# Patient Record
Sex: MALE | Race: WHITE | Employment: OTHER | URBAN - METROPOLITAN AREA
[De-identification: names, ages, dates, MRNs, and addresses within clinical notes are randomized per-mention and may not be internally consistent; named-entity substitution may affect disease eponyms.]

---

## 2017-01-04 PROBLEM — G21.11 NEUROLEPTIC-INDUCED PARKINSONISM (HCC): Status: ACTIVE | Noted: 2017-01-04

## 2017-01-04 PROBLEM — T43.505A NEUROLEPTIC-INDUCED PARKINSONISM (HCC): Status: ACTIVE | Noted: 2017-01-04

## 2017-04-24 PROBLEM — E78.00 HYPERCHOLESTEROLEMIA: Status: ACTIVE | Noted: 2017-04-24

## 2017-04-24 PROBLEM — I70.8 AORTO-ILIAC ATHEROSCLEROSIS (HCC): Status: ACTIVE | Noted: 2017-04-24

## 2017-04-24 PROBLEM — I70.0 AORTO-ILIAC ATHEROSCLEROSIS (HCC): Status: ACTIVE | Noted: 2017-04-24

## 2018-06-11 PROBLEM — J47.9 BRONCHIECTASIS WITHOUT COMPLICATION (HCC): Status: ACTIVE | Noted: 2018-06-11

## 2018-09-25 PROCEDURE — 86803 HEPATITIS C AB TEST: CPT | Performed by: FAMILY MEDICINE

## 2018-09-25 PROCEDURE — 36415 COLL VENOUS BLD VENIPUNCTURE: CPT | Performed by: FAMILY MEDICINE

## 2019-03-11 PROBLEM — G47.33 OSA (OBSTRUCTIVE SLEEP APNEA): Status: ACTIVE | Noted: 2019-03-11

## 2019-03-19 PROBLEM — N18.30 CKD (CHRONIC KIDNEY DISEASE) STAGE 3, GFR 30-59 ML/MIN (HCC): Status: ACTIVE | Noted: 2019-03-19

## 2019-04-14 PROBLEM — G47.33 OSA (OBSTRUCTIVE SLEEP APNEA): Status: RESOLVED | Noted: 2019-03-11 | Resolved: 2019-04-14

## 2020-02-10 ENCOUNTER — APPOINTMENT (OUTPATIENT)
Dept: CT IMAGING | Facility: HOSPITAL | Age: 75
DRG: 388 | End: 2020-02-10
Attending: EMERGENCY MEDICINE
Payer: MEDICARE

## 2020-02-10 ENCOUNTER — HOSPITAL ENCOUNTER (INPATIENT)
Facility: HOSPITAL | Age: 75
LOS: 8 days | Discharge: HOME | DRG: 388 | End: 2020-02-18
Attending: EMERGENCY MEDICINE | Admitting: INTERNAL MEDICINE
Payer: MEDICARE

## 2020-02-10 ENCOUNTER — APPOINTMENT (OUTPATIENT)
Dept: GENERAL RADIOLOGY | Facility: HOSPITAL | Age: 75
DRG: 388 | End: 2020-02-10
Attending: EMERGENCY MEDICINE
Payer: MEDICARE

## 2020-02-10 DIAGNOSIS — E87.1 HYPONATREMIA: ICD-10-CM

## 2020-02-10 DIAGNOSIS — K56.600 PARTIAL INTESTINAL OBSTRUCTION, UNSPECIFIED CAUSE (HCC): Primary | ICD-10-CM

## 2020-02-10 PROBLEM — S37.001S: Status: ACTIVE | Noted: 2020-02-10

## 2020-02-10 LAB
ALBUMIN SERPL-MCNC: 2.5 G/DL (ref 3.4–5)
ALP LIVER SERPL-CCNC: 99 U/L (ref 45–117)
ALT SERPL-CCNC: 32 U/L (ref 16–61)
ANION GAP SERPL CALC-SCNC: 6 MMOL/L (ref 0–18)
AST SERPL-CCNC: 32 U/L (ref 15–37)
BASOPHILS # BLD AUTO: 0.03 X10(3) UL (ref 0–0.2)
BASOPHILS NFR BLD AUTO: 0.2 %
BILIRUB DIRECT SERPL-MCNC: 0.3 MG/DL (ref 0–0.2)
BILIRUB SERPL-MCNC: 0.7 MG/DL (ref 0.1–2)
BILIRUB UR QL: NEGATIVE
BUN BLD-MCNC: 16 MG/DL (ref 7–18)
BUN/CREAT SERPL: 15.1 (ref 10–20)
CALCIUM BLD-MCNC: 8.4 MG/DL (ref 8.5–10.1)
CHLORIDE SERPL-SCNC: 93 MMOL/L (ref 98–112)
CLARITY UR: CLEAR
CO2 SERPL-SCNC: 28 MMOL/L (ref 21–32)
COLOR UR: YELLOW
CREAT BLD-MCNC: 1.06 MG/DL (ref 0.7–1.3)
DEPRECATED RDW RBC AUTO: 43.7 FL (ref 35.1–46.3)
EOSINOPHIL # BLD AUTO: 0 X10(3) UL (ref 0–0.7)
EOSINOPHIL NFR BLD AUTO: 0 %
ERYTHROCYTE [DISTWIDTH] IN BLOOD BY AUTOMATED COUNT: 13.3 % (ref 11–15)
FLUAV + FLUBV RNA SPEC NAA+PROBE: NEGATIVE
FLUAV + FLUBV RNA SPEC NAA+PROBE: NEGATIVE
FLUAV + FLUBV RNA SPEC NAA+PROBE: POSITIVE
GLUCOSE BLD-MCNC: 107 MG/DL (ref 70–99)
GLUCOSE UR-MCNC: NEGATIVE MG/DL
HCT VFR BLD AUTO: 31.2 % (ref 39–53)
HGB BLD-MCNC: 11.1 G/DL (ref 13–17.5)
HGB UR QL STRIP.AUTO: NEGATIVE
IMM GRANULOCYTES # BLD AUTO: 0.08 X10(3) UL (ref 0–1)
IMM GRANULOCYTES NFR BLD: 0.6 %
KETONES UR-MCNC: 20 MG/DL
LACTATE SERPL-SCNC: 1.2 MMOL/L (ref 0.4–2)
LEUKOCYTE ESTERASE UR QL STRIP.AUTO: NEGATIVE
LIPASE SERPL-CCNC: 69 U/L (ref 73–393)
LYMPHOCYTES # BLD AUTO: 0.66 X10(3) UL (ref 1–4)
LYMPHOCYTES NFR BLD AUTO: 4.8 %
M PROTEIN MFR SERPL ELPH: 6.7 G/DL (ref 6.4–8.2)
MCH RBC QN AUTO: 31.8 PG (ref 26–34)
MCHC RBC AUTO-ENTMCNC: 35.6 G/DL (ref 31–37)
MCV RBC AUTO: 89.4 FL (ref 80–100)
MONOCYTES # BLD AUTO: 1.09 X10(3) UL (ref 0.1–1)
MONOCYTES NFR BLD AUTO: 7.9 %
NEUTROPHILS # BLD AUTO: 11.86 X10 (3) UL (ref 1.5–7.7)
NEUTROPHILS # BLD AUTO: 11.86 X10(3) UL (ref 1.5–7.7)
NEUTROPHILS NFR BLD AUTO: 86.5 %
NITRITE UR QL STRIP.AUTO: NEGATIVE
OSMOLALITY SERPL CALC.SUM OF ELEC: 266 MOSM/KG (ref 275–295)
PH UR: 6 [PH] (ref 5–8)
PLATELET # BLD AUTO: 291 10(3)UL (ref 150–450)
POTASSIUM SERPL-SCNC: 4.5 MMOL/L (ref 3.5–5.1)
PROT UR-MCNC: 100 MG/DL
RBC # BLD AUTO: 3.49 X10(6)UL (ref 3.8–5.8)
RBC #/AREA URNS AUTO: 18 /HPF
SODIUM SERPL-SCNC: 127 MMOL/L (ref 136–145)
SODIUM SERPL-SCNC: 16 MMOL/L
UROBILINOGEN UR STRIP-ACNC: <2
WBC # BLD AUTO: 13.7 X10(3) UL (ref 4–11)
WBC #/AREA URNS AUTO: 2 /HPF

## 2020-02-10 PROCEDURE — 83690 ASSAY OF LIPASE: CPT | Performed by: EMERGENCY MEDICINE

## 2020-02-10 PROCEDURE — 74177 CT ABD & PELVIS W/CONTRAST: CPT | Performed by: EMERGENCY MEDICINE

## 2020-02-10 PROCEDURE — 83605 ASSAY OF LACTIC ACID: CPT | Performed by: EMERGENCY MEDICINE

## 2020-02-10 PROCEDURE — 96361 HYDRATE IV INFUSION ADD-ON: CPT

## 2020-02-10 PROCEDURE — 99285 EMERGENCY DEPT VISIT HI MDM: CPT

## 2020-02-10 PROCEDURE — 96374 THER/PROPH/DIAG INJ IV PUSH: CPT

## 2020-02-10 PROCEDURE — 71045 X-RAY EXAM CHEST 1 VIEW: CPT | Performed by: EMERGENCY MEDICINE

## 2020-02-10 PROCEDURE — 87040 BLOOD CULTURE FOR BACTERIA: CPT | Performed by: EMERGENCY MEDICINE

## 2020-02-10 PROCEDURE — 83935 ASSAY OF URINE OSMOLALITY: CPT | Performed by: INTERNAL MEDICINE

## 2020-02-10 PROCEDURE — 84300 ASSAY OF URINE SODIUM: CPT | Performed by: INTERNAL MEDICINE

## 2020-02-10 PROCEDURE — 87631 RESP VIRUS 3-5 TARGETS: CPT | Performed by: EMERGENCY MEDICINE

## 2020-02-10 PROCEDURE — 80076 HEPATIC FUNCTION PANEL: CPT | Performed by: EMERGENCY MEDICINE

## 2020-02-10 PROCEDURE — 36415 COLL VENOUS BLD VENIPUNCTURE: CPT

## 2020-02-10 PROCEDURE — 80048 BASIC METABOLIC PNL TOTAL CA: CPT | Performed by: EMERGENCY MEDICINE

## 2020-02-10 PROCEDURE — 85025 COMPLETE CBC W/AUTO DIFF WBC: CPT | Performed by: EMERGENCY MEDICINE

## 2020-02-10 PROCEDURE — 81001 URINALYSIS AUTO W/SCOPE: CPT | Performed by: EMERGENCY MEDICINE

## 2020-02-10 RX ORDER — KETOROLAC TROMETHAMINE 30 MG/ML
30 INJECTION, SOLUTION INTRAMUSCULAR; INTRAVENOUS ONCE
Status: COMPLETED | OUTPATIENT
Start: 2020-02-10 | End: 2020-02-10

## 2020-02-10 NOTE — ED PROVIDER NOTES
Patient Seen in: [facility] Emergency Department      History   Patient presents with:  Abnormal Result    Stated Complaint:     HPI    81-year-old male presents the ER with complaints of abdominal pain. Patient is febrile in the ER as well.   Jazz Castaneda are as noted in HPI. Constitutional and vital signs reviewed. All other systems reviewed and negative except as noted above.     Physical Exam     ED Triage Vitals   BP 02/10/20 1559 (!) 157/115   Pulse 02/10/20 1601 108   Resp 02/10/20 1601 20   Temp Chloride 93 (*)     Calcium, Total 8.4 (*)     Calculated Osmolality 266 (*)     All other components within normal limits   LIPASE - Abnormal; Notable for the following components:    Lipase 69 (*)     All other components within normal limits   HEPATIC F obstruction  Admission disposition: 2/10/2020  8:13 PM                   Disposition and Plan     Clinical Impression:  Partial intestinal obstruction, unspecified cause ([facility] [address]  (primary encounter diagnosis)  Hyponatremia    Disposition:  Admit  2/10/2020

## 2020-02-10 NOTE — ED INITIAL ASSESSMENT (HPI)
Pt has a small bowel obs, and obs in the kidney. Pt is hyponatremic at 126 as well.  Pt needs to be admitted per md

## 2020-02-11 ENCOUNTER — APPOINTMENT (OUTPATIENT)
Dept: GENERAL RADIOLOGY | Facility: HOSPITAL | Age: 75
DRG: 388 | End: 2020-02-11
Attending: SURGERY
Payer: MEDICARE

## 2020-02-11 LAB
ANION GAP SERPL CALC-SCNC: 8 MMOL/L (ref 0–18)
BASOPHILS # BLD AUTO: 0.03 X10(3) UL (ref 0–0.2)
BASOPHILS NFR BLD AUTO: 0.2 %
BUN BLD-MCNC: 20 MG/DL (ref 7–18)
BUN/CREAT SERPL: 20.8 (ref 10–20)
CALCIUM BLD-MCNC: 7.5 MG/DL (ref 8.5–10.1)
CHLORIDE SERPL-SCNC: 99 MMOL/L (ref 98–112)
CO2 SERPL-SCNC: 24 MMOL/L (ref 21–32)
CREAT BLD-MCNC: 0.96 MG/DL (ref 0.7–1.3)
DEPRECATED RDW RBC AUTO: 44.1 FL (ref 35.1–46.3)
EOSINOPHIL # BLD AUTO: 0.01 X10(3) UL (ref 0–0.7)
EOSINOPHIL NFR BLD AUTO: 0.1 %
ERYTHROCYTE [DISTWIDTH] IN BLOOD BY AUTOMATED COUNT: 13.5 % (ref 11–15)
GLUCOSE BLD-MCNC: 90 MG/DL (ref 70–99)
HAV IGM SER QL: 2.3 MG/DL (ref 1.6–2.6)
HCT VFR BLD AUTO: 28 % (ref 39–53)
HGB BLD-MCNC: 9.6 G/DL (ref 13–17.5)
IMM GRANULOCYTES # BLD AUTO: 0.09 X10(3) UL (ref 0–1)
IMM GRANULOCYTES NFR BLD: 0.7 %
LYMPHOCYTES # BLD AUTO: 0.76 X10(3) UL (ref 1–4)
LYMPHOCYTES NFR BLD AUTO: 5.9 %
MCH RBC QN AUTO: 30.5 PG (ref 26–34)
MCHC RBC AUTO-ENTMCNC: 34.3 G/DL (ref 31–37)
MCV RBC AUTO: 88.9 FL (ref 80–100)
MONOCYTES # BLD AUTO: 1.6 X10(3) UL (ref 0.1–1)
MONOCYTES NFR BLD AUTO: 12.4 %
NEUTROPHILS # BLD AUTO: 10.42 X10 (3) UL (ref 1.5–7.7)
NEUTROPHILS # BLD AUTO: 10.42 X10(3) UL (ref 1.5–7.7)
NEUTROPHILS NFR BLD AUTO: 80.7 %
OSMOLALITY SERPL CALC.SUM OF ELEC: 274 MOSM/KG (ref 275–295)
OSMOLALITY UR: 645 MOSM/KG (ref 300–1100)
PLATELET # BLD AUTO: 274 10(3)UL (ref 150–450)
POTASSIUM SERPL-SCNC: 4.2 MMOL/L (ref 3.5–5.1)
RBC # BLD AUTO: 3.15 X10(6)UL (ref 3.8–5.8)
SODIUM SERPL-SCNC: 131 MMOL/L (ref 136–145)
WBC # BLD AUTO: 12.9 X10(3) UL (ref 4–11)

## 2020-02-11 PROCEDURE — 85025 COMPLETE CBC W/AUTO DIFF WBC: CPT | Performed by: INTERNAL MEDICINE

## 2020-02-11 PROCEDURE — 74270 X-RAY XM COLON 1CNTRST STD: CPT | Performed by: SURGERY

## 2020-02-11 PROCEDURE — 80048 BASIC METABOLIC PNL TOTAL CA: CPT | Performed by: INTERNAL MEDICINE

## 2020-02-11 PROCEDURE — 83735 ASSAY OF MAGNESIUM: CPT | Performed by: INTERNAL MEDICINE

## 2020-02-11 RX ORDER — LEVOTHYROXINE SODIUM 0.12 MG/1
125 TABLET ORAL
Status: DISCONTINUED | OUTPATIENT
Start: 2020-02-11 | End: 2020-02-18

## 2020-02-11 RX ORDER — FINASTERIDE 5 MG/1
5 TABLET, FILM COATED ORAL DAILY
Status: DISCONTINUED | OUTPATIENT
Start: 2020-02-11 | End: 2020-02-18

## 2020-02-11 RX ORDER — DOCUSATE SODIUM 100 MG/1
100 CAPSULE, LIQUID FILLED ORAL 2 TIMES DAILY
Status: DISCONTINUED | OUTPATIENT
Start: 2020-02-11 | End: 2020-02-12

## 2020-02-11 RX ORDER — METOPROLOL SUCCINATE 25 MG/1
25 TABLET, EXTENDED RELEASE ORAL
Status: DISCONTINUED | OUTPATIENT
Start: 2020-02-11 | End: 2020-02-18

## 2020-02-11 RX ORDER — ARIPIPRAZOLE 2 MG/1
2.5 TABLET ORAL DAILY
Status: DISCONTINUED | OUTPATIENT
Start: 2020-02-11 | End: 2020-02-18

## 2020-02-11 RX ORDER — TAMSULOSIN HYDROCHLORIDE 0.4 MG/1
0.4 CAPSULE ORAL DAILY
Status: DISCONTINUED | OUTPATIENT
Start: 2020-02-11 | End: 2020-02-18

## 2020-02-11 RX ORDER — ALBUTEROL SULFATE 90 UG/1
2 AEROSOL, METERED RESPIRATORY (INHALATION) EVERY 6 HOURS PRN
Status: DISCONTINUED | OUTPATIENT
Start: 2020-02-11 | End: 2020-02-18

## 2020-02-11 RX ORDER — ROSUVASTATIN CALCIUM 5 MG/1
5 TABLET, COATED ORAL NIGHTLY
Status: DISCONTINUED | OUTPATIENT
Start: 2020-02-11 | End: 2020-02-18

## 2020-02-11 RX ORDER — ACETAMINOPHEN 325 MG/1
650 TABLET ORAL EVERY 6 HOURS PRN
Status: DISCONTINUED | OUTPATIENT
Start: 2020-02-11 | End: 2020-02-18

## 2020-02-11 RX ORDER — ONDANSETRON 4 MG/1
4 TABLET, FILM COATED ORAL EVERY 6 HOURS PRN
Status: DISCONTINUED | OUTPATIENT
Start: 2020-02-11 | End: 2020-02-18

## 2020-02-11 RX ORDER — SODIUM CHLORIDE 9 MG/ML
INJECTION, SOLUTION INTRAVENOUS CONTINUOUS
Status: DISCONTINUED | OUTPATIENT
Start: 2020-02-11 | End: 2020-02-13

## 2020-02-11 RX ORDER — DULOXETIN HYDROCHLORIDE 30 MG/1
60 CAPSULE, DELAYED RELEASE ORAL DAILY
Status: DISCONTINUED | OUTPATIENT
Start: 2020-02-11 | End: 2020-02-18

## 2020-02-11 NOTE — ED NOTES
Pt arrived to ER with an PIV from [facility], Removed and different IV placed.

## 2020-02-11 NOTE — H&P
[facility] HOSPITALIST HISTORY AND PHYSICAL     CC: Patient presents with:  Abnormal Result       PCP: Kyrie German MD    History of Present Illness:   Patient is a 69year old male with PMH sig for HTN, HL, EMELY, CKD3, hypothyroidism here with abdominal pain/ s L2-L3 for spinal stenosis - Dr. Heather Hodges   • TONSILLECTOMY          ALL:    Dander                  OTHER (SEE COMMENTS)    Comment:Cats-congestion in chest when around cats          Social History    Tobacco Use      Smoking status: Never Smoker      Sm FINDINGS: RIGHT TESTICLE:  The right testis measures 4.3 x 2.0 x 1.8 cm. ECHOTEXTURE:  Mild heterogeneous echotexture      MASS: None. VASCULAR FLOW: Mild hypervascularity      EPIDIDYMIS:  4 mm epididymal cyst. Additional 4 mm scrotal melissa.  LEF mass or cyst is identified. No peripancreatic inflammatory changes. No pancreatic ductal dilatation. SPLEEN: No splenic mass. Spleen is within normal limits in size. STOMACH: There is a small hiatal hernia. There is no abnormal gastric wall thickening.  ADR CARDIAC/MEDIAST: The cardiomediastinal silhouette is not enlarged. There is stable calcification of the aortic knob. VASC/JON:    The hilar contours are normal. LUNGS/PLEURA:  There is stable mild atelectasis at both lung bases.   There is no pleural effu ROUTINE, 11/10/2015, 10:31. INDICATIONS: Abdominal pain, hyponatremic today. TECHNIQUE:   Single view. FINDINGS:  CARDIAC/VASC: No cardiac silhouette abnormality or cardiomegaly. Unremarkable pulmonary vasculature.   MEDIAST/JON:   Atherosclerotic aor are few hypodensities too small to characterize. SPLEEN: No enlargement or focal lesion. GALLBLADDER: No cholelithiasis. PANCREAS: No lesion, fluid collection, ductal dilatation, or atrophy. ADRENALS: No defined mass or abnormal enlargement.   KIDNEYS: Hyacinth Grace although a discrete transition point is not clearly defined. Small bilateral pleural effusions. Enlarged prostate. Lesser incidental findings as above.      Dictated by (CST): Courtney Madrid MD on 2/10/2020 at 18:49     Approved by (CST): Rafa Martin above.    [facility] hospitalist to continue to follow patient while in house        Ambrosio Nur,  [facility] Hospitalist  Pager [phone]    2/11/2020  10:59 AM

## 2020-02-11 NOTE — CONSULTS
[facility]OSP - [facility]    Report of Consultation    Florencio Hendrix Patient Status:  Inpatient    1945 MRN [id number]   Location [facility] 3W/SW Attending Ruben Bustamante MD   Hosp Day # 1 PCP Hever Isabel MD     Date of Admission: • Solitary left kidney     (atrophied right kidney)   • Tremor     essential   • Umbilical hernia 1553     Past Surgical History:   Procedure Laterality Date   • OTHER SURGICAL HISTORY  1986    thyroidectomy for thyroid cancer   • SPINAL FUSION  2012 lower extremities  NEURO: no sensory or motor complaint  PSYCHE: no symptoms of depression or anxiety  HEMATOLOGY: no bruising or excessive bleeding;  ENDOCRINE: denies excessive thirst or urination; denies unexpected wt gain or wt loss      Physical Exam: MD CHAVA on 2/11/2020 at 12:53     Approved by (CST): Shavonne Malave MD on 2/11/2020 at 12:56          Us Testicles (evw=[id number])    Result Date: 2/10/2020  IMPRESSION: 1.  Heterogeneous, hypervascular right testis sonographically suggestive of orchitis likely representing atelectasis or scar. 2.  Prominent bowel distention in the visualized right upper quadrant the abdomen. This could represent changes of ileus or bowel obstruction.   Chronic bowel dilatation such as [place] syndrome could potentially gi Hyponatremia      Impression  No evidence of bowel compromise. I saw pt in AM, BE done later shows no obstruction.      I have asked NG to be removed, draining small amts and no evidence of SBO  Pt does have right hydro,  on case  Will start diet and la

## 2020-02-11 NOTE — CONSULTS
[facility]    Report of Consultation    Shahzad Wells Patient Status:  Inpatient    1945 MRN [id number]   Location [facility] 3W/SW Attending Milton Sosa MD   Hosp Day # 1 PCP Mónica Molina MD     Reason for Consultat essential   • Umbilical hernia 8106     Past Surgical History:   Procedure Laterality Date   • OTHER SURGICAL HISTORY  1986    thyroidectomy for thyroid cancer   • SPINAL FUSION  2012    L3-L5 for spinal stenosis - Dr. Annika Wynn   • Jah Solano  [facility] erythema/crepitus/induration/necrosis  Extremities: moves all extremities equally, no deformities  Neurologic: CNs II-XII grossly intact   Psych: normal mood and affect    Laboratory Data:  Lab Results   Component Value Date    WBC 12.9 02/11/2020    HGB 9 obtained after the intravenous administration nonionic contrast.  Multiplanar reformations obtained. Dose reduction techniques utilized.   Dose information is transmitted to the [facility] ([facility]   of Radiology) Nae Holder 35 ([address]) associated renal parenchymal thickening likely chronic hydronephrosis. Decreased enhancement right kidney as compared to the left also likely relating to chronic obstruction. Findings appear to relate to   chronic UPJ obstruction.   Asymmetric right perin Spleen is within normal limits in size. STOMACH: There is a small hiatal hernia. There is no abnormal gastric wall thickening. ADRENAL GLANDS: No adrenal mass or nodule.   KIDNEYS: The left kidney is normal. There is severe right hydronephrosis with obstr inconsistent with ultrasound findings   His urinalysis is not suggestive of infection  Consider treating with abx though exam inconsistent with ultrasound findings - will discuss with IM    RIGHT HYDROCELE  Not bothersome  Conservative management    [facility]

## 2020-02-11 NOTE — CONSULTS
[facility]    Report of Consultation    Jefe Risk Patient Status:  Inpatient    1945 MRN [id number]   Location [facility] 3W/SW Attending Gabe De La Torre MD   Hosp Day # 1 PCP Sal Sesay MD     Date of Admission: personality disorder ([facility] [address]    • OCD (obsessive compulsive disorder)     Dr. Wilfrid Sher (psychiatry) @ [facility]   • EMELY (obstructive sleep apnea) 3/24/18-PSG    AHI 7 RDI 9 REM AHI 5 Supine AHI 9 non-supine AHI 3.8 Sao2 Gideon 82%    • EMELY (obstructive 100 mg, Oral, BID      Rosuvastatin Calcium 5 MG Oral Tab, Take one tablet by mouth every day for cholesterol  Levothyroxine Sodium 112 MCG Oral Tab, TAKE ONE TABLET BY MOUTH  EVERY DAY FOR THYROID  Metoprolol Succinate ER 50 MG Oral Tablet 24 Hr, TAKE 1 T ALB 2.5 (L) 02/10/2020    ALKPHO 99 02/10/2020    TP 6.7 02/10/2020    AST 32 02/10/2020    ALT 32 02/10/2020    INR 1.0 10/19/2015    T4F 1.35 06/01/2017    TSH 4.841 03/18/2019    LIP 69 (L) 02/10/2020    PSA 6.79 (H) 02/05/2018    DDIMER 0.50 05/29/2 feeding tube placement. The tip of the tube is in the distal esophagus. The distal 5 cm of the tube is folded upon itself at the level of the gastroesophageal junction. Recommend adjustment in position.    Dictated by (CST): Mae Fragoso MD on 2/10/2020 Rene Dunlap MD  [address]  Nephrology

## 2020-02-11 NOTE — PLAN OF CARE
Problem: Patient Centered Care  Goal: Patient preferences are identified and integrated in the patient's plan of care  Description  Interventions:  - What would you like us to know as we care for you? Keep me updated on my care.   - Provide timely, comple toileting schedule  Outcome: Progressing     Problem: DISCHARGE PLANNING  Goal: Discharge to home or other facility with appropriate resources  Description  INTERVENTIONS:  - Identify barriers to discharge w/pt and caregiver  - Include patient/family/disch

## 2020-02-11 NOTE — PAYOR COMM NOTE
--------------  Appropriate for inpatient status per guidelines for abdominal pain with temp to 103, influenza positive, and hydronephrosis with UPJ obstruction.       ADMISSION REVIEW     Payor: [address] [place] [address] #:  [phone]  [facility] TONSILLECTOMY                        Review of Systems    Positive for stated complaint:   Other systems are as noted in HPI. Constitutional and vital signs reviewed. All other systems reviewed and negative except as noted above.     Physical Exam Abnormal; Notable for the following components:       Result Value    Glucose 107 (*)     Sodium 127 (*)     Chloride 93 (*)     Calcium, Total 8.4 (*)     Calculated Osmolality 266 (*)     All other components within normal limits   LIPASE - Abnormal; Not [facility])  (primary encounter diagnosis)  Hyponatremia    Disposition:  Admit  2/10/2020  8:13 pm                 CT ABD 2/10      CONCLUSION:      Moderate to severe right hydronephrosis with associated renal parenchymal thickening likely chronic hydronephros medically managed and resolved. + remote prior colonscopy. His workup was also notable for a hyponatremia as well as abnormal CT and testicular US showing findings of mod to severe R hydronephrosis and UPJ obstruction as well as possible orchitis.  He denie       MDD  HTN  HL  EMELY   Aortic atherosclerosis  CKD3  Hypothyroidism (hx thyroid ca sp surg 1986) on synthroid  Neuroleptic induced parkinsonism  Obesity BMI 28.42     Abdominal pain, bowel obstruction v ileus  - gen surg consulted  - relief noted s/p with abx though exam inconsistent with ultrasound findings - will discuss with IM     RIGHT HYDROCELE  Not bothersome  Conservative management     MICROHEMATURIA  UA = 18 RBCs  Consider outpatient cystoscopy                     2/11 SURG    Impression  No [id number]   Collected:  2/10/2020 19:05 Status:  Final result   Specimen Information: Nasopharyngeal swab;  Other        Component  Ref Range & Units    Influenza A by PCR  Negative Negative    Influenza B by PCR  Negative Negative    RSV by PCR  Negative P

## 2020-02-12 ENCOUNTER — APPOINTMENT (OUTPATIENT)
Dept: GENERAL RADIOLOGY | Facility: HOSPITAL | Age: 75
DRG: 388 | End: 2020-02-12
Attending: PHYSICIAN ASSISTANT
Payer: MEDICARE

## 2020-02-12 LAB
ALBUMIN SERPL-MCNC: 2.4 G/DL (ref 3.4–5)
ANION GAP SERPL CALC-SCNC: 4 MMOL/L (ref 0–18)
ANION GAP SERPL CALC-SCNC: 4 MMOL/L (ref 0–18)
BASE EXCESS BLD CALC-SCNC: 3.2 MMOL/L (ref ?–2)
BASOPHILS # BLD AUTO: 0.04 X10(3) UL (ref 0–0.2)
BASOPHILS NFR BLD AUTO: 0.2 %
BUN BLD-MCNC: 15 MG/DL (ref 7–18)
BUN BLD-MCNC: 15 MG/DL (ref 7–18)
BUN/CREAT SERPL: 15.3 (ref 10–20)
BUN/CREAT SERPL: 15.3 (ref 10–20)
CALCIUM BLD-MCNC: 8 MG/DL (ref 8.5–10.1)
CALCIUM BLD-MCNC: 8 MG/DL (ref 8.5–10.1)
CHLORIDE SERPL-SCNC: 100 MMOL/L (ref 98–112)
CHLORIDE SERPL-SCNC: 100 MMOL/L (ref 98–112)
CO2 SERPL-SCNC: 28 MMOL/L (ref 21–32)
CO2 SERPL-SCNC: 28 MMOL/L (ref 21–32)
CREAT BLD-MCNC: 0.98 MG/DL (ref 0.7–1.3)
CREAT BLD-MCNC: 0.98 MG/DL (ref 0.7–1.3)
DEPRECATED RDW RBC AUTO: 45 FL (ref 35.1–46.3)
EOSINOPHIL # BLD AUTO: 0.06 X10(3) UL (ref 0–0.7)
EOSINOPHIL NFR BLD AUTO: 0.4 %
ERYTHROCYTE [DISTWIDTH] IN BLOOD BY AUTOMATED COUNT: 13.8 % (ref 11–15)
GLUCOSE BLD-MCNC: 105 MG/DL (ref 70–99)
GLUCOSE BLD-MCNC: 105 MG/DL (ref 70–99)
HAV IGM SER QL: 2.4 MG/DL (ref 1.6–2.6)
HCO3 BLDA-SCNC: 27.4 MEQ/L (ref 21–27)
HCT VFR BLD AUTO: 33.5 % (ref 39–53)
HGB BLD-MCNC: 11.4 G/DL (ref 13–17.5)
IMM GRANULOCYTES # BLD AUTO: 0.18 X10(3) UL (ref 0–1)
IMM GRANULOCYTES NFR BLD: 1.1 %
LYMPHOCYTES # BLD AUTO: 1.23 X10(3) UL (ref 1–4)
LYMPHOCYTES NFR BLD AUTO: 7.6 %
MCH RBC QN AUTO: 30.5 PG (ref 26–34)
MCHC RBC AUTO-ENTMCNC: 34 G/DL (ref 31–37)
MCV RBC AUTO: 89.6 FL (ref 80–100)
MODIFIED ALLEN TEST: POSITIVE
MONOCYTES # BLD AUTO: 1.75 X10(3) UL (ref 0.1–1)
MONOCYTES NFR BLD AUTO: 10.9 %
NEUTROPHILS # BLD AUTO: 12.84 X10 (3) UL (ref 1.5–7.7)
NEUTROPHILS # BLD AUTO: 12.84 X10(3) UL (ref 1.5–7.7)
NEUTROPHILS NFR BLD AUTO: 79.8 %
O2 CT BLD-SCNC: 15.1 VOL% (ref 15–23)
OSMOLALITY SERPL CALC.SUM OF ELEC: 275 MOSM/KG (ref 275–295)
OSMOLALITY SERPL CALC.SUM OF ELEC: 275 MOSM/KG (ref 275–295)
OXYGEN LITERS/MINUTE: 2 L/MIN
PCO2 BLDA: 37 MM HG (ref 35–45)
PH BLDA: 7.47 [PH] (ref 7.35–7.45)
PHOSPHATE SERPL-MCNC: 1.5 MG/DL (ref 2.5–4.9)
PHOSPHATE SERPL-MCNC: 1.5 MG/DL (ref 2.5–4.9)
PLATELET # BLD AUTO: 322 10(3)UL (ref 150–450)
PO2 BLDA: 70 MM HG (ref 80–100)
POTASSIUM SERPL-SCNC: 4.2 MMOL/L (ref 3.5–5.1)
POTASSIUM SERPL-SCNC: 4.2 MMOL/L (ref 3.5–5.1)
PROCALCITONIN SERPL-MCNC: 3.2 NG/ML
PUNCTURE CHARGE: YES
RBC # BLD AUTO: 3.74 X10(6)UL (ref 3.8–5.8)
SAO2 % BLDA: 97.5 % (ref 94–100)
SODIUM SERPL-SCNC: 132 MMOL/L (ref 136–145)
SODIUM SERPL-SCNC: 132 MMOL/L (ref 136–145)
WBC # BLD AUTO: 16.1 X10(3) UL (ref 4–11)

## 2020-02-12 PROCEDURE — 84145 PROCALCITONIN (PCT): CPT | Performed by: INTERNAL MEDICINE

## 2020-02-12 PROCEDURE — 84100 ASSAY OF PHOSPHORUS: CPT | Performed by: PHYSICIAN ASSISTANT

## 2020-02-12 PROCEDURE — 85025 COMPLETE CBC W/AUTO DIFF WBC: CPT | Performed by: PHYSICIAN ASSISTANT

## 2020-02-12 PROCEDURE — 80069 RENAL FUNCTION PANEL: CPT | Performed by: INTERNAL MEDICINE

## 2020-02-12 PROCEDURE — 36600 WITHDRAWAL OF ARTERIAL BLOOD: CPT | Performed by: INTERNAL MEDICINE

## 2020-02-12 PROCEDURE — 82805 BLOOD GASES W/O2 SATURATION: CPT | Performed by: INTERNAL MEDICINE

## 2020-02-12 PROCEDURE — 74019 RADEX ABDOMEN 2 VIEWS: CPT | Performed by: PHYSICIAN ASSISTANT

## 2020-02-12 PROCEDURE — 83735 ASSAY OF MAGNESIUM: CPT | Performed by: PHYSICIAN ASSISTANT

## 2020-02-12 RX ORDER — LEVOFLOXACIN 5 MG/ML
750 INJECTION, SOLUTION INTRAVENOUS EVERY 24 HOURS
Status: DISCONTINUED | OUTPATIENT
Start: 2020-02-12 | End: 2020-02-18

## 2020-02-12 NOTE — PROGRESS NOTES
[facility]    Progress Note    Kizzy Woods Patient Status:  Inpatient    1945 MRN [id number]   Location [facility] 4W/SW/SE Attending Shivani Harrison, [address] [facility] Day # 2 PCP Saloni Donis MD        Subjective:   Patient 02/12/2020    CA 8.0 (L) 02/12/2020    ALB 2.4 (L) 02/12/2020    ALKPHO 99 02/10/2020    BILT 0.7 02/10/2020    TP 6.7 02/10/2020    AST 32 02/10/2020    ALT 32 02/10/2020    INR 1.0 10/19/2015    T4F 1.35 06/01/2017    TSH 4.841 03/18/2019    LIP 69 (L) 0 2/11/2020 at 6:13          Xr Chest Ap Portable  (cpt=71045)    Result Date: 2/10/2020  CONCLUSION: Post feeding tube placement. The tip of the tube is in the distal esophagus.   The distal 5 cm of the tube is folded upon itself at the level of the [employer] MD on 2/10/2020 at 18:49     Approved by (CST): Jarett Mondragon MD on 2/10/2020 at 19:14                     Rd Turner PA-C  2/12/2020

## 2020-02-12 NOTE — CONSULTS
[facility] HOSP - [facility]    Report of Consultation    Navya Colbert Patient Status:  Inpatient    1945 MRN [id number]   Location [facility] 4W/SW/SE Attending Steven Aly, [address] [facility] Day # 2 PCP Lisa Ponce MD     Date of Admissi unspecified hyperlipidemia    • Post-surgical hypothyroidism    • Seborrheic dermatitis    • Solitary left kidney     (atrophied right kidney)   • Tremor     essential   • Umbilical hernia 4608       Past Surgical History  Past Surgical History:   Procedur EVERY DAY FOR THYROID  Metoprolol Succinate ER 50 MG Oral Tablet 24 Hr, TAKE 1 TABLET BY MOUTH  EVERY DAY FOR BLOOD  PRESSURE  amLODIPine Besy-Benazepril HCl 2.5-10 MG Oral Cap, TAKE 1 CAPSULE BY MOUTH  EVERY DAY FOR BLOOD  PRESSURE  hydrochlorothiazide 1 CO2 28.0 02/12/2020     (H) 02/12/2020     (H) 02/12/2020    CA 8.0 (L) 02/12/2020    CA 8.0 (L) 02/12/2020    ALB 2.4 (L) 02/12/2020    ALKPHO 99 02/10/2020    TP 6.7 02/10/2020    AST 32 02/10/2020    ALT 32 02/10/2020    INR 1.0 10/19/2015 abdomen. This could represent changes of ileus or bowel obstruction. Chronic bowel dilatation such as Neema syndrome could potentially give this appearance. 3.  Post multilevel thoracic-lumbar spine fusion.    Dictated by (CST): Tommie Lozada MD on 2/10/ abdominal distention and constipation over the last 7 days. He has had a similar presentation in the past.  He presents with leukocytosis which is likely due to RSV and PNA.    - KUB with moderate diffuse distention of the colon.   Lower GI Series with con

## 2020-02-12 NOTE — RESPIRATORY THERAPY NOTE
EMELY ASSESSMENT:    Pt does not have a previous diagnosis of EMELY. Pt does not routinely use a CPAP device at home.

## 2020-02-12 NOTE — PLAN OF CARE
Problem: Patient Centered Care  Goal: Patient preferences are identified and integrated in the patient's plan of care  Description  Interventions:  - What would you like us to know as we care for you? Keep me updated on my care.   - Provide timely, comple Consider OT/PT consult to assist with strengthening/mobility  - Encourage toileting schedule  Outcome: Progressing  Note:   Fall precautions in place. Pt educated to call staff for assistance ambulating. Frequent visual checks by RN and PCT.       Problem:

## 2020-02-12 NOTE — PROGRESS NOTES
[facility]    Progress Note    Navya [facility] Patient Status:  Inpatient    1945 MRN [id number]   Location [facility] 4W/SW/SE Attending Steven Aly, [address] [facility] Day # 2 PCP Lisa Ponce MD            Subjective:     Lydia Gamez 8. 0*  8.0*   ALB 2.5* 2.5*  --  2.4*   * 127* 131* 132*  132*   K 4.90 4.5 4.2 4.2  4.2   CL 92* 93* 99 100  100   CO2 28.6 28.0 24.0 28.0  28.0   ALKPHO 103 99  --   --    AST 36 32  --   --    ALT 37 32  --   --    BILT 0.73 0.7  --   --    TP 6.5 placement. The tip of the tube is in the distal esophagus. The distal 5 cm of the tube is folded upon itself at the level of the gastroesophageal junction. Recommend adjustment in position.    Dictated by (CST): Bob Osman MD on 2/10/2020 at 21:15 and Plan:       Partial intestinal obstruction, unspecified cause ([facility] [address]   Work up shows no colon obstruction    Awaiting repeat xray abd today    Most likely with cont to increase diet          Lydia Palomo MD [facility]  General Surgery  [facility]  2

## 2020-02-12 NOTE — PROGRESS NOTES
[facility]    Progress Note    Josue Olivier Patient Status:  Inpatient    1945 MRN [id number]   Location [facility] 4W/SW/SE Attending Esvin Mancia, [address] [facility] Day # 2 PCP Natty Rucker MD     Assessment and Plan:   P 02/12/2020    BUN 15 02/12/2020    BUN 15 02/12/2020     (L) 02/12/2020     (L) 02/12/2020    K 4.2 02/12/2020    K 4.2 02/12/2020     02/12/2020     02/12/2020    CO2 28.0 02/12/2020    CO2 28.0 02/12/2020     (H) 02/12/2020 2/10/2020  CONCLUSION:  1. Small right basal pleural effusion. Nonspecific bibasilar pulmonary opacities likely representing atelectasis or scar. 2.  Prominent bowel distention in the visualized right upper quadrant the abdomen.   This could represent amanda

## 2020-02-12 NOTE — PROGRESS NOTES
[facility] Hospitalist Progress Note   CC: follow-up hospital admission    ASSESSMENT/PLAN:  69year old male with PMH sig for HTN, HL, EMELY, CKD3, hypothyroidism here with abdominal pain/ swelling 2/2 bowel obs/ ileus s/p NG placement, R hydronephrosis- chronic, Rosuvastatin Calcium  5 mg Oral Nightly   • Metoprolol Succinate ER  25 mg Oral Daily Beta Blocker   • tamsulosin HCl  0.4 mg Oral Daily     Continuous Infusions:   • sodium chloride 75 mL/hr at 02/12/20 0547     PRN Meds: Ondansetron HCl, acetaminophen, A Value Date    PROTIME 9.9 10/19/2015    INR 1.0 10/19/2015       Maye Gonzales [facility] Hospitalist  Pager: [phone]  Answering Service: [phone]

## 2020-02-13 LAB
ALBUMIN SERPL-MCNC: 2 G/DL (ref 3.4–5)
ANION GAP SERPL CALC-SCNC: 6 MMOL/L (ref 0–18)
ANION GAP SERPL CALC-SCNC: 6 MMOL/L (ref 0–18)
BASOPHILS # BLD AUTO: 0.03 X10(3) UL (ref 0–0.2)
BASOPHILS NFR BLD AUTO: 0.2 %
BUN BLD-MCNC: 10 MG/DL (ref 7–18)
BUN BLD-MCNC: 10 MG/DL (ref 7–18)
BUN/CREAT SERPL: 11.6 (ref 10–20)
BUN/CREAT SERPL: 11.6 (ref 10–20)
CALCIUM BLD-MCNC: 7.5 MG/DL (ref 8.5–10.1)
CALCIUM BLD-MCNC: 7.5 MG/DL (ref 8.5–10.1)
CHLORIDE SERPL-SCNC: 100 MMOL/L (ref 98–112)
CHLORIDE SERPL-SCNC: 100 MMOL/L (ref 98–112)
CO2 SERPL-SCNC: 28 MMOL/L (ref 21–32)
CO2 SERPL-SCNC: 28 MMOL/L (ref 21–32)
CREAT BLD-MCNC: 0.86 MG/DL (ref 0.7–1.3)
CREAT BLD-MCNC: 0.86 MG/DL (ref 0.7–1.3)
DEPRECATED RDW RBC AUTO: 43.9 FL (ref 35.1–46.3)
EOSINOPHIL # BLD AUTO: 0.16 X10(3) UL (ref 0–0.7)
EOSINOPHIL NFR BLD AUTO: 1.3 %
ERYTHROCYTE [DISTWIDTH] IN BLOOD BY AUTOMATED COUNT: 13.7 % (ref 11–15)
GLUCOSE BLD-MCNC: 135 MG/DL (ref 70–99)
GLUCOSE BLD-MCNC: 135 MG/DL (ref 70–99)
HAV IGM SER QL: 1.9 MG/DL (ref 1.6–2.6)
HCT VFR BLD AUTO: 29 % (ref 39–53)
HGB BLD-MCNC: 10.1 G/DL (ref 13–17.5)
IMM GRANULOCYTES # BLD AUTO: 0.14 X10(3) UL (ref 0–1)
IMM GRANULOCYTES NFR BLD: 1.2 %
LYMPHOCYTES # BLD AUTO: 0.91 X10(3) UL (ref 1–4)
LYMPHOCYTES NFR BLD AUTO: 7.6 %
MCH RBC QN AUTO: 30.6 PG (ref 26–34)
MCHC RBC AUTO-ENTMCNC: 34.8 G/DL (ref 31–37)
MCV RBC AUTO: 87.9 FL (ref 80–100)
MONOCYTES # BLD AUTO: 1.18 X10(3) UL (ref 0.1–1)
MONOCYTES NFR BLD AUTO: 9.8 %
NEUTROPHILS # BLD AUTO: 9.63 X10 (3) UL (ref 1.5–7.7)
NEUTROPHILS # BLD AUTO: 9.63 X10(3) UL (ref 1.5–7.7)
NEUTROPHILS NFR BLD AUTO: 79.9 %
OSMOLALITY SERPL CALC.SUM OF ELEC: 279 MOSM/KG (ref 275–295)
OSMOLALITY SERPL CALC.SUM OF ELEC: 279 MOSM/KG (ref 275–295)
PHOSPHATE SERPL-MCNC: 1.6 MG/DL (ref 2.5–4.9)
PHOSPHATE SERPL-MCNC: 1.6 MG/DL (ref 2.5–4.9)
PLATELET # BLD AUTO: 260 10(3)UL (ref 150–450)
POTASSIUM SERPL-SCNC: 4.1 MMOL/L (ref 3.5–5.1)
POTASSIUM SERPL-SCNC: 4.1 MMOL/L (ref 3.5–5.1)
PROCALCITONIN SERPL-MCNC: 1.6 NG/ML
RBC # BLD AUTO: 3.3 X10(6)UL (ref 3.8–5.8)
SODIUM SERPL-SCNC: 134 MMOL/L (ref 136–145)
SODIUM SERPL-SCNC: 134 MMOL/L (ref 136–145)
WBC # BLD AUTO: 12.1 X10(3) UL (ref 4–11)

## 2020-02-13 PROCEDURE — 94640 AIRWAY INHALATION TREATMENT: CPT

## 2020-02-13 PROCEDURE — 80069 RENAL FUNCTION PANEL: CPT | Performed by: PHYSICIAN ASSISTANT

## 2020-02-13 PROCEDURE — 84145 PROCALCITONIN (PCT): CPT | Performed by: INTERNAL MEDICINE

## 2020-02-13 PROCEDURE — 83735 ASSAY OF MAGNESIUM: CPT | Performed by: PHYSICIAN ASSISTANT

## 2020-02-13 PROCEDURE — 85025 COMPLETE CBC W/AUTO DIFF WBC: CPT | Performed by: PHYSICIAN ASSISTANT

## 2020-02-13 PROCEDURE — 80048 BASIC METABOLIC PNL TOTAL CA: CPT | Performed by: PHYSICIAN ASSISTANT

## 2020-02-13 PROCEDURE — 84100 ASSAY OF PHOSPHORUS: CPT | Performed by: PHYSICIAN ASSISTANT

## 2020-02-13 RX ORDER — HEPARIN SODIUM 5000 [USP'U]/ML
5000 INJECTION, SOLUTION INTRAVENOUS; SUBCUTANEOUS EVERY 8 HOURS SCHEDULED
Status: DISCONTINUED | OUTPATIENT
Start: 2020-02-13 | End: 2020-02-18

## 2020-02-13 RX ORDER — IPRATROPIUM BROMIDE AND ALBUTEROL SULFATE 2.5; .5 MG/3ML; MG/3ML
3 SOLUTION RESPIRATORY (INHALATION)
Status: DISCONTINUED | OUTPATIENT
Start: 2020-02-13 | End: 2020-02-18

## 2020-02-13 RX ORDER — CODEINE PHOSPHATE AND GUAIFENESIN 10; 100 MG/5ML; MG/5ML
5 SOLUTION ORAL EVERY 6 HOURS PRN
Status: DISCONTINUED | OUTPATIENT
Start: 2020-02-13 | End: 2020-02-18

## 2020-02-13 RX ORDER — IPRATROPIUM BROMIDE AND ALBUTEROL SULFATE 2.5; .5 MG/3ML; MG/3ML
3 SOLUTION RESPIRATORY (INHALATION) EVERY 4 HOURS PRN
Status: DISCONTINUED | OUTPATIENT
Start: 2020-02-13 | End: 2020-02-18

## 2020-02-13 NOTE — PROGRESS NOTES
Progress Note    Puneet Dutta Patient Status:  Inpatient    1945 MRN [id number]   Location [facility] 4W/SW/SE Attending Wali Yo, [address] [facility] Day # 3 PCP Husam Romano MD     Subjective:  Puneet Dutta is a(n) 69year old ma OBSTRUCTION  Severe right hydronephrosis, likely secondary to UPJ obstruction, no left hydronephrosis  NO flank pain and renal function wnl  Conservative management - no surgical intervention necessary at this time     ABNORMAL SCRUS/POSSIBLE ORCHITIS  SCR

## 2020-02-13 NOTE — PROGRESS NOTES
[facility]    Progress Note    Saumya Morales Patient Status:  Inpatient    1945 MRN [id number]   Location [facility] 4W/SW/SE Attending Deepti Walker, [address] [facility] Day # 3 PCP Qiana Marquez MD        Subjective:   Patient Approved by (CST): Fallon Malave MD on 2/11/2020 at 12:56          Xr Abdomen Obstructive Series Routine(2 Vw)(cpt=74019)    Result Date: 2/12/2020  CONCLUSION:  1. Ileus with less pronounced gaseous distension.  2. Multilevel spinal fusion from T

## 2020-02-13 NOTE — PROGRESS NOTES
[place] [facility] - [facility]    Progress Note    Mayi Wright Patient Status:  Inpatient    1945 MRN [id number]   Location [facility] 4W/SW/SE Attending Deisi Ardon, [address] [facility] Day # 3 PCP Carrie Sewell MD     Assessment and Plan:   P 02/13/2020     (L) 02/13/2020     (L) 02/13/2020    K 4.1 02/13/2020    K 4.1 02/13/2020     02/13/2020     02/13/2020    CO2 28.0 02/13/2020    CO2 28.0 02/13/2020     (H) 02/13/2020     (H) 02/13/2020    CA 7.5 (L) 0

## 2020-02-13 NOTE — PROGRESS NOTES
[facility]    Progress Note    Sharon Montez Patient Status:  Inpatient    1945 MRN [id number]   Location [facility] 4W/SW/SE Attending Komal Cazares, [address] [facility] Day # 3 PCP Patricia Rodríguez MD            Subjective:     Jenn Padgett > 80 90 88  88 99  99   GFRNAA  --    < > 69 78 76  76 86  86   CA  --    < > 8.4* 7.5* 8.0*  8.0* 7.5*  7.5*   ALB 2.5*  --  2.5*  --  2.4* 2.0*   *   < > 127* 131* 132*  132* 134*  134*   K 4.90   < > 4.5 4.2 4.2  4.2 4.1  4.1   CL 92*   < > 93* 99

## 2020-02-13 NOTE — PROGRESS NOTES
[facility] Hospitalist Progress Note   CC: follow-up hospital admission    SUBJECTIVE:  Interval History:   - feeling better tolerating cld  - cont to have cough, no wheezing, minimal sputum production     OBJECTIVE:  Scheduled Meds:   • potassium & sodium phosph 02/10/20  1305 02/10/20  1733 02/11/20  0610 02/12/20  0546 02/13/20  0512   WBC 13.74* 13.7* 12.9* 16.1* 12.1*   RBC 3.68* 3.49* 3.15* 3.74* 3.30*   HGB 11.4* 11.1* 9.6* 11.4* 10.1*   HCT 32.8* 31.2* 28.0* 33.5* 29.0*    291.0 274.0 322.0 260.0 improving  - hold hctz     Leukocytosis 2/2 pneumonia   - likely 2/2 RSV w/ superimposed bacterial component given sig elevated PCT   - levaquin day 2  - PCT and WBC downtrending     Community acquired pneumonia, RSV w/ super imposed bacterial component

## 2020-02-14 ENCOUNTER — APPOINTMENT (OUTPATIENT)
Dept: GENERAL RADIOLOGY | Facility: HOSPITAL | Age: 75
DRG: 388 | End: 2020-02-14
Attending: PHYSICIAN ASSISTANT
Payer: MEDICARE

## 2020-02-14 LAB
ALBUMIN SERPL-MCNC: 2 G/DL (ref 3.4–5)
ANION GAP SERPL CALC-SCNC: 6 MMOL/L (ref 0–18)
BASOPHILS # BLD AUTO: 0.08 X10(3) UL (ref 0–0.2)
BASOPHILS NFR BLD AUTO: 0.6 %
BUN BLD-MCNC: 6 MG/DL (ref 7–18)
BUN/CREAT SERPL: 7.9 (ref 10–20)
CALCIUM BLD-MCNC: 7.6 MG/DL (ref 8.5–10.1)
CHLORIDE SERPL-SCNC: 103 MMOL/L (ref 98–112)
CO2 SERPL-SCNC: 25 MMOL/L (ref 21–32)
CREAT BLD-MCNC: 0.76 MG/DL (ref 0.7–1.3)
DEPRECATED RDW RBC AUTO: 48.8 FL (ref 35.1–46.3)
EOSINOPHIL # BLD AUTO: 0.2 X10(3) UL (ref 0–0.7)
EOSINOPHIL NFR BLD AUTO: 1.6 %
ERYTHROCYTE [DISTWIDTH] IN BLOOD BY AUTOMATED COUNT: 14.2 % (ref 11–15)
GLUCOSE BLD-MCNC: 102 MG/DL (ref 70–99)
GLUCOSE BLDC GLUCOMTR-MCNC: 111 MG/DL (ref 70–99)
HAV IGM SER QL: 1.9 MG/DL (ref 1.6–2.6)
HCT VFR BLD AUTO: 33 % (ref 39–53)
HGB BLD-MCNC: 11.1 G/DL (ref 13–17.5)
IMM GRANULOCYTES # BLD AUTO: 0.15 X10(3) UL (ref 0–1)
IMM GRANULOCYTES NFR BLD: 1.2 %
LYMPHOCYTES # BLD AUTO: 1.27 X10(3) UL (ref 1–4)
LYMPHOCYTES NFR BLD AUTO: 9.9 %
MCH RBC QN AUTO: 31.4 PG (ref 26–34)
MCHC RBC AUTO-ENTMCNC: 33.6 G/DL (ref 31–37)
MCV RBC AUTO: 93.2 FL (ref 80–100)
MONOCYTES # BLD AUTO: 1.48 X10(3) UL (ref 0.1–1)
MONOCYTES NFR BLD AUTO: 11.5 %
NEUTROPHILS # BLD AUTO: 9.71 X10 (3) UL (ref 1.5–7.7)
NEUTROPHILS # BLD AUTO: 9.71 X10(3) UL (ref 1.5–7.7)
NEUTROPHILS NFR BLD AUTO: 75.2 %
OSMOLALITY SERPL CALC.SUM OF ELEC: 276 MOSM/KG (ref 275–295)
PHOSPHATE SERPL-MCNC: 2.2 MG/DL (ref 2.5–4.9)
PHOSPHATE SERPL-MCNC: 2.2 MG/DL (ref 2.5–4.9)
PLATELET # BLD AUTO: 236 10(3)UL (ref 150–450)
POTASSIUM SERPL-SCNC: 4.5 MMOL/L (ref 3.5–5.1)
RBC # BLD AUTO: 3.54 X10(6)UL (ref 3.8–5.8)
SODIUM SERPL-SCNC: 134 MMOL/L (ref 136–145)
WBC # BLD AUTO: 12.9 X10(3) UL (ref 4–11)

## 2020-02-14 PROCEDURE — 74021 RADEX ABDOMEN 3+ VIEWS: CPT | Performed by: PHYSICIAN ASSISTANT

## 2020-02-14 PROCEDURE — 94640 AIRWAY INHALATION TREATMENT: CPT

## 2020-02-14 PROCEDURE — 82962 GLUCOSE BLOOD TEST: CPT

## 2020-02-14 PROCEDURE — 84100 ASSAY OF PHOSPHORUS: CPT | Performed by: PHYSICIAN ASSISTANT

## 2020-02-14 PROCEDURE — 85025 COMPLETE CBC W/AUTO DIFF WBC: CPT | Performed by: PHYSICIAN ASSISTANT

## 2020-02-14 PROCEDURE — 80069 RENAL FUNCTION PANEL: CPT | Performed by: INTERNAL MEDICINE

## 2020-02-14 PROCEDURE — 83735 ASSAY OF MAGNESIUM: CPT | Performed by: PHYSICIAN ASSISTANT

## 2020-02-14 RX ORDER — ECHINACEA PURPUREA EXTRACT 125 MG
1 TABLET ORAL
Status: DISCONTINUED | OUTPATIENT
Start: 2020-02-14 | End: 2020-02-18

## 2020-02-14 RX ORDER — FLUTICASONE PROPIONATE 50 MCG
2 SPRAY, SUSPENSION (ML) NASAL 2 TIMES DAILY
Status: DISCONTINUED | OUTPATIENT
Start: 2020-02-14 | End: 2020-02-18

## 2020-02-14 RX ORDER — PREDNISONE 20 MG/1
40 TABLET ORAL
Status: COMPLETED | OUTPATIENT
Start: 2020-02-14 | End: 2020-02-18

## 2020-02-14 NOTE — PROGRESS NOTES
[place] [facility] - [facility]    Progress Note    Humphrey Kamara Patient Status:  Inpatient    1945 MRN [id number]   Location [facility] 4W/SW/SE Attending Bennett Peraza, [address] [facility] Day # 4 PCP Noah Quesada MD        Subjective:   Patient Tejal Jackman MD on 2/14/2020 at 8:48          Xr Abdomen Obstructive Series Routine(2 Vw)(cpt=74019)    Result Date: 2/12/2020  CONCLUSION:  1. Ileus with less pronounced gaseous distension.  2. Multilevel spinal fusion from T9 through S1.    Dictated by (CST): S

## 2020-02-14 NOTE — PROGRESS NOTES
[facility] Hospitalist Progress Note   CC: follow-up hospital admission    SUBJECTIVE:  Interval History:   - wheezing and coughing overnight,   - dyspnea overall improved, but has remained stable over the past 24-48 hours  - tolerating clears, did have some more swelling noted. Integument: No lesions. No erythema. Psychiatric: Appropriate mood and affect.     Data Review:   CBC:   Recent Labs   Lab 02/10/20  1733 02/11/20  0610 02/12/20  0546 02/13/20  0512 02/14/20  0629   WBC 13.7* 12.9* 16.1* 12.1* 12.9*   RBC now (if needed levoflox), hydro is chronic -med management at this time     Hyponatremia  - renal on cs w/u ordered  - improving  - hold hctz  - d/w Dr. Alejo Guadarrama     Hypophosphatemia  - cont replacements      Leukocytosis 2/2 pneumonia   - likely 2/2 RSV w/ rodriguez

## 2020-02-14 NOTE — PROGRESS NOTES
[facility]    Progress Note    Navya Colbert Patient Status:  Inpatient    1945 MRN [id number]   Location [facility] 4W/SW/SE Attending Steven Aly, [address] [facility] Day # 4 PCP Lisa Ponce MD     Assessment and Plan:   P 02/14/2020     (L) 02/14/2020    K 4.5 02/14/2020     02/14/2020    CO2 25.0 02/14/2020     (H) 02/14/2020    CA 7.6 (L) 02/14/2020    ALB 2.0 (L) 02/14/2020    ALKPHO 99 02/10/2020    BILT 0.7 02/10/2020    TP 6.7 02/10/2020    AST 32 0

## 2020-02-14 NOTE — PLAN OF CARE
Cristina Foster is aware of POC at this time. Droplet precautions in place. ABD distended. Diet moved back to clears. Pt on RA. Nebs for wheezing. Inhaler for SOB with resolution. Saline mist given for nasal congestion.      Phosphorous replaced per o physical limitations  - Instruct pt to call for assistance with activity based on assessment  - Modify environment to reduce risk of injury  - Provide assistive devices as appropriate  - Consider OT/PT consult to assist with strengthening/mobility  - Encou medications  - Provide nonpharmacologic comfort measures as appropriate  - Advance diet as tolerated, if ordered  - Obtain nutritional consult as needed  - Evaluate fluid balance  Outcome: Progressing  Goal: Maintains adequate nutritional intake (undernour minimize respiratory effort  - Oxygen supplementation based on oxygen saturation or ABGs  - Provide Smoking Cessation handout, if applicable  - Encourage broncho-pulmonary hygiene including cough, deep breathe, Incentive Spirometry  - Assess the need for s and nutrition restrictions as appropriate  Outcome: Progressing     Problem: SKIN/TISSUE INTEGRITY - ADULT  Goal: Skin integrity remains intact  Description  INTERVENTIONS  - Assess and document risk factors for pressure ulcer development  - Assess and doc

## 2020-02-14 NOTE — PROGRESS NOTES
[place] [facility] - [facility]    Progress Note    Jamila Fuller Patient Status:  Inpatient    1945 MRN [id number]   Location [facility] 4W/SW/SE Attending Marcial Gant, [address] [facility] Day # 4 PCP Yoanna Kiser MD            Subjective:     Zachary Barr 102*   BUN 15.0  --  16   < > 15  15 10  10 6*   CREATSERUM 1.08  --  1.06   < > 0.98  0.98 0.86  0.86 0.76   GFRAA  --   --  80   < > 88  88 99  99 105   GFRNAA  --   --  69   < > 76  76 86  86 91   CA  --   --  8.4*   < > 8.0*  8.0* 7.5*  7.5* 7.6*   ALB 2/14  Discussed xray that showed mod large colon  Measures about 10 cm    . Pamela Lanza MD [facility]  General Surgery  [facility]  2/14/2020

## 2020-02-14 NOTE — DIETARY NOTE
ADULT NUTRITION INITIAL ASSESSMENT    Pt is at moderate nutrition risk. Pt does not meet malnutrition criteria.       RECOMMENDATIONS TO MD:  increase diet as tolerated to low fiber/soft     NUTRITION DIAGNOSIS/PROBLEM:  Altered GI function related to al unspecified hyperlipidemia, Post-surgical hypothyroidism, Seborrheic dermatitis, Solitary left kidney, Tremor, and Umbilical hernia (6919). ANTHROPOMETRICS:  HT: 157.5 cm (5' 2\")  WT: 69.7 kg (153 lb 11.2 oz)   BMI: Body mass index is 28.11 kg/m².   BM Intravenous Q24H   • ARIpiprazole  2.5 mg Oral Daily   • DULoxetine HCl  60 mg Oral Daily   • finasteride  5 mg Oral Daily   • Levothyroxine Sodium  125 mcg Oral Before breakfast   • Rosuvastatin Calcium  5 mg Oral Nightly   • Metoprolol Succinate ER  25 m

## 2020-02-14 NOTE — PLAN OF CARE
Problem: Patient Centered Care  Goal: Patient preferences are identified and integrated in the patient's plan of care  Description  Interventions:  - What would you like us to know as we care for you? Keep me updated on my care.   - Provide timely, comple toileting schedule  Outcome: Progressing     Problem: DISCHARGE PLANNING  Goal: Discharge to home or other facility with appropriate resources  Description  INTERVENTIONS:  - Identify barriers to discharge w/pt and caregiver  - Include patient/family/disch (undernourished)  Description  INTERVENTIONS:  - Monitor percentage of each meal consumed  - Identify factors contributing to decreased intake, treat as appropriate  - Assist with meals as needed  - Monitor I&O, WT and lab values  - Obtain nutritional cons need for suctioning and perform as needed  - Assess and instruct to report SOB or any respiratory difficulty  - Respiratory Therapy support as indicated  - Manage/alleviate anxiety  - Monitor for signs/symptoms of CO2 retention  Outcome: Progressing     Pr and document skin integrity  - Monitor for areas of redness and/or skin breakdown  - Initiate interventions, skin care algorithm/standards of care as needed  Outcome: Progressing  Goal: Oral mucous membranes remain intact  Description  INTERVENTIONS  - Ass

## 2020-02-14 NOTE — PLAN OF CARE
Bettyjane Dakin is aware of POC at this time. Droplet precautions in place. Pt on 1 L NC. Nebs ordered for wheezing. Inhaler for SOB with resolution. Phosphorous replaced per orders. Plan for xr abd tomorrow. Patient having loose Bms.  He is tolerati safety including physical limitations  - Instruct pt to call for assistance with activity based on assessment  - Modify environment to reduce risk of injury  - Provide assistive devices as appropriate  - Consider OT/PT consult to assist with strengthening/

## 2020-02-15 ENCOUNTER — APPOINTMENT (OUTPATIENT)
Dept: GENERAL RADIOLOGY | Facility: HOSPITAL | Age: 75
DRG: 388 | End: 2020-02-15
Attending: SURGERY
Payer: MEDICARE

## 2020-02-15 LAB
ALBUMIN SERPL-MCNC: 1.9 G/DL (ref 3.4–5)
ANION GAP SERPL CALC-SCNC: 4 MMOL/L (ref 0–18)
BASOPHILS # BLD AUTO: 0.03 X10(3) UL (ref 0–0.2)
BASOPHILS NFR BLD AUTO: 0.3 %
BUN BLD-MCNC: 10 MG/DL (ref 7–18)
BUN/CREAT SERPL: 14.3 (ref 10–20)
CALCIUM BLD-MCNC: 7.8 MG/DL (ref 8.5–10.1)
CHLORIDE SERPL-SCNC: 103 MMOL/L (ref 98–112)
CO2 SERPL-SCNC: 28 MMOL/L (ref 21–32)
CREAT BLD-MCNC: 0.7 MG/DL (ref 0.7–1.3)
DEPRECATED RDW RBC AUTO: 44.6 FL (ref 35.1–46.3)
EOSINOPHIL # BLD AUTO: 0.02 X10(3) UL (ref 0–0.7)
EOSINOPHIL NFR BLD AUTO: 0.2 %
ERYTHROCYTE [DISTWIDTH] IN BLOOD BY AUTOMATED COUNT: 13.5 % (ref 11–15)
GLUCOSE BLD-MCNC: 109 MG/DL (ref 70–99)
HAV IGM SER QL: 1.9 MG/DL (ref 1.6–2.6)
HCT VFR BLD AUTO: 29.6 % (ref 39–53)
HGB BLD-MCNC: 10 G/DL (ref 13–17.5)
IMM GRANULOCYTES # BLD AUTO: 0.21 X10(3) UL (ref 0–1)
IMM GRANULOCYTES NFR BLD: 2 %
LYMPHOCYTES # BLD AUTO: 1.02 X10(3) UL (ref 1–4)
LYMPHOCYTES NFR BLD AUTO: 9.7 %
MCH RBC QN AUTO: 30.2 PG (ref 26–34)
MCHC RBC AUTO-ENTMCNC: 33.8 G/DL (ref 31–37)
MCV RBC AUTO: 89.4 FL (ref 80–100)
MONOCYTES # BLD AUTO: 0.85 X10(3) UL (ref 0.1–1)
MONOCYTES NFR BLD AUTO: 8.1 %
NEUTROPHILS # BLD AUTO: 8.39 X10 (3) UL (ref 1.5–7.7)
NEUTROPHILS # BLD AUTO: 8.39 X10(3) UL (ref 1.5–7.7)
NEUTROPHILS NFR BLD AUTO: 79.7 %
OSMOLALITY SERPL CALC.SUM OF ELEC: 280 MOSM/KG (ref 275–295)
PHOSPHATE SERPL-MCNC: 2.5 MG/DL (ref 2.5–4.9)
PLATELET # BLD AUTO: 332 10(3)UL (ref 150–450)
POTASSIUM SERPL-SCNC: 4.2 MMOL/L (ref 3.5–5.1)
RBC # BLD AUTO: 3.31 X10(6)UL (ref 3.8–5.8)
SODIUM SERPL-SCNC: 135 MMOL/L (ref 136–145)
WBC # BLD AUTO: 10.5 X10(3) UL (ref 4–11)

## 2020-02-15 PROCEDURE — 80069 RENAL FUNCTION PANEL: CPT | Performed by: INTERNAL MEDICINE

## 2020-02-15 PROCEDURE — 94640 AIRWAY INHALATION TREATMENT: CPT

## 2020-02-15 PROCEDURE — 74021 RADEX ABDOMEN 3+ VIEWS: CPT | Performed by: SURGERY

## 2020-02-15 PROCEDURE — 85025 COMPLETE CBC W/AUTO DIFF WBC: CPT | Performed by: PHYSICIAN ASSISTANT

## 2020-02-15 PROCEDURE — 83735 ASSAY OF MAGNESIUM: CPT | Performed by: INTERNAL MEDICINE

## 2020-02-15 NOTE — PROGRESS NOTES
[facility]    Progress Note    Hdez Steven Patient Status:  Inpatient    1945 MRN [id number]   Location [facility] 4W/SW/SE Attending Juan Courtney, [address] [facility] Day # 5 PCP Bernie Sanchez MD            Subjective:     Diane Ambrocio 107*   < > 135*  135* 102* 109*   BUN 15.0 16   < > 10  10 6* 10   CREATSERUM 1.08 1.06   < > 0.86  0.86 0.76 0.70   GFRAA  --  80   < > 99  99 105 108   GFRNAA  --  69   < > 86  86 91 94   CA  --  8.4*   < > 7.5*  7.5* 7.6* 7.8*   ALB 2.5* 2.5*   < > 2.0* pneumonia    DVT prophy: SCDs, heparin subq              Tomi Weston MD [facility]  General Surgery  [facility]  2/15/2020  12:06 PM

## 2020-02-15 NOTE — PROGRESS NOTES
[facility] Hospitalist Progress Note   CC: follow-up hospital admission    SUBJECTIVE:  Interval History:   - tolerating clears  - no dyspnea  - cough improving  - no abd pain    OBJECTIVE:  Scheduled Meds:   • Fluticasone Propionate  2 spray Each Nare BID   • pr Recent Labs   Lab 02/11/20  0610 02/12/20  0546 02/13/20  0512 02/14/20  0629 02/15/20  0559   WBC 12.9* 16.1* 12.1* 12.9* 10.5   RBC 3.15* 3.74* 3.30* 3.54* 3.31*   HGB 9.6* 11.4* 10.1* 11.1* 10.0*   HCT 28.0* 33.5* 29.0* 33.0* 29.6*   .0 322.0 2 eval, NO clinical s/sx orchitis, monitor off abx, hydro is chronic, rec med management at this time     Hyponatremia  - renal on cs w/u ordered  - likely holding hctz indefinitely   - sodium normalizing     Hypophosphatemia  - replaced  - wnl today       L

## 2020-02-15 NOTE — PLAN OF CARE
Patient up with standby assist and the walker. Tolerating full liquid diet. Complaining of productive cough, robutussin PRN. Bilateral upper extremity tremors at baseline. Remote tele discontinued. Heparin and SCDs for DVT prevention. On room air.  Passing of falls.   - [place] fall precautions as indicated by assessment.  - Educate pt/family on patient safety including physical limitations  - Instruct pt to call for assistance with activity based on assessment  - Modify environment to reduce risk of injury Nasogastric tube to low intermittent suction as ordered  - Evaluate effectiveness of ordered antiemetic medications  - Provide nonpharmacologic comfort measures as appropriate  - Advance diet as tolerated, if ordered  - Obtain nutritional consult as needed respiratory status  - Assess for changes in mentation and behavior  - Position to facilitate oxygenation and minimize respiratory effort  - Oxygen supplementation based on oxygen saturation or ABGs  - Provide Smoking Cessation handout, if applicable  - Enc pressure (other measures as available)  - Encourage oral intake as appropriate  - Instruct patient on fluid and nutrition restrictions as appropriate  Outcome: Progressing     Problem: SKIN/TISSUE INTEGRITY - ADULT  Goal: Skin integrity remains intact  Bob

## 2020-02-15 NOTE — PLAN OF CARE
Patient A/O x 4. VSS. On O2 1L. Afebrile this shift. Remote tele in place - no calls received. Tolerating clear liquid diet. Voiding freely. Denies pain and nausea. Fall precautions in place. Call light within reach. Patient updated on plan of care.      Pr limitations  - Instruct pt to call for assistance with activity based on assessment  - Modify environment to reduce risk of injury  - Provide assistive devices as appropriate  - Consider OT/PT consult to assist with strengthening/mobility  - Encourage toil Provide nonpharmacologic comfort measures as appropriate  - Advance diet as tolerated, if ordered  - Obtain nutritional consult as needed  - Evaluate fluid balance  Outcome: Progressing  Goal: Maintains adequate nutritional intake (undernourished)  Descrip respiratory effort  - Oxygen supplementation based on oxygen saturation or ABGs  - Provide Smoking Cessation handout, if applicable  - Encourage broncho-pulmonary hygiene including cough, deep breathe, Incentive Spirometry  - Assess the need for suctioning nutrition restrictions as appropriate  Outcome: Progressing     Problem: SKIN/TISSUE INTEGRITY - ADULT  Goal: Skin integrity remains intact  Description  INTERVENTIONS  - Assess and document risk factors for pressure ulcer development  - Assess and documen

## 2020-02-15 NOTE — PROGRESS NOTES
[facility]    Progress Note    Anibal Mohit Patient Status:  Inpatient    1945 MRN [id number]   Location [facility] 4W/SW/SE Attending Chely Sam, [address] [facility] Day # 5 PCP Nanda Neri MD        Subjective:   Patient 2/14/2020  CONCLUSION:  1. Worsening moderate air-filled dilatation of ascending, transverse and proximal descending colon with air in nondilated distal colon and rectum. Findings suggest worsening ileus high Lynsey involving colon. See above.   No gross

## 2020-02-16 LAB
ALBUMIN SERPL-MCNC: 2.1 G/DL (ref 3.4–5)
ANION GAP SERPL CALC-SCNC: 6 MMOL/L (ref 0–18)
BUN BLD-MCNC: 14 MG/DL (ref 7–18)
BUN/CREAT SERPL: 16.1 (ref 10–20)
CALCIUM BLD-MCNC: 8.1 MG/DL (ref 8.5–10.1)
CHLORIDE SERPL-SCNC: 101 MMOL/L (ref 98–112)
CO2 SERPL-SCNC: 29 MMOL/L (ref 21–32)
CREAT BLD-MCNC: 0.87 MG/DL (ref 0.7–1.3)
GLUCOSE BLD-MCNC: 110 MG/DL (ref 70–99)
OSMOLALITY SERPL CALC.SUM OF ELEC: 283 MOSM/KG (ref 275–295)
PHOSPHATE SERPL-MCNC: 2.9 MG/DL (ref 2.5–4.9)
POTASSIUM SERPL-SCNC: 4.2 MMOL/L (ref 3.5–5.1)
SODIUM SERPL-SCNC: 136 MMOL/L (ref 136–145)

## 2020-02-16 PROCEDURE — 94640 AIRWAY INHALATION TREATMENT: CPT

## 2020-02-16 PROCEDURE — 80069 RENAL FUNCTION PANEL: CPT | Performed by: INTERNAL MEDICINE

## 2020-02-16 RX ORDER — AMLODIPINE BESYLATE 2.5 MG/1
2.5 TABLET ORAL DAILY
Status: DISCONTINUED | OUTPATIENT
Start: 2020-02-16 | End: 2020-02-18

## 2020-02-16 NOTE — PLAN OF CARE
Patient resting comfortable. Tolerating diet. VSS. No complaints of pain. Cough medication administered as needed. Urinal at bedside. Droplet precautions maintained.     Problem: Patient Centered Care  Goal: Patient preferences are identified and integrated environment to reduce risk of injury  - Provide assistive devices as appropriate  - Consider OT/PT consult to assist with strengthening/mobility  - Encourage toileting schedule  Outcome: Progressing     Problem: DISCHARGE PLANNING  Goal: Discharge to home Obtain nutritional consult as needed  - Evaluate fluid balance  Outcome: Progressing  Goal: Maintains adequate nutritional intake (undernourished)  Description  INTERVENTIONS:  - Monitor percentage of each meal consumed  - Identify factors contributing to handout, if applicable  - Encourage broncho-pulmonary hygiene including cough, deep breathe, Incentive Spirometry  - Assess the need for suctioning and perform as needed  - Assess and instruct to report SOB or any respiratory difficulty  - Respiratory Ther integrity remains intact  Description  INTERVENTIONS  - Assess and document risk factors for pressure ulcer development  - Assess and document skin integrity  - Monitor for areas of redness and/or skin breakdown  - Initiate interventions, skin care algorit

## 2020-02-16 NOTE — PROGRESS NOTES
[facility]    Progress Note    [facility]:  [facility]    1945 MRN [id number]   Location [facility] 4W/SW/SE Attending Hayley Escalante, [address] [facility] Day # 6 PCP Pati Brown MD        Subjective:   Patient EMELY, HTN, HLD, Hypothyroidism, and Depression who presents with abdominal pain and distention.   GI consulted for colonic ileus.     Ogelvies Syndrome (Colonic Ileus):  - Patient presents with RSV symptoms however noted to have abdominal distention and cons

## 2020-02-16 NOTE — PLAN OF CARE
Patient up with standby assist and the walker. Passing gas, no BM today. LFS diet. No complaints of nausea. Droplet precautions in place for +RSV. Robitussin prn for cough. Heparin for Dvt prevention. Baseline tremors. Using urinal to void.  Plan for repeat precautions as indicated by assessment.  - Educate pt/family on patient safety including physical limitations  - Instruct pt to call for assistance with activity based on assessment  - Modify environment to reduce risk of injury  - Provide assistive device intermittent suction as ordered  - Evaluate effectiveness of ordered antiemetic medications  - Provide nonpharmacologic comfort measures as appropriate  - Advance diet as tolerated, if ordered  - Obtain nutritional consult as needed  - Evaluate fluid roma for changes in mentation and behavior  - Position to facilitate oxygenation and minimize respiratory effort  - Oxygen supplementation based on oxygen saturation or ABGs  - Provide Smoking Cessation handout, if applicable  - Encourage broncho-pulmonary hygi available)  - Encourage oral intake as appropriate  - Instruct patient on fluid and nutrition restrictions as appropriate  Outcome: Progressing     Problem: SKIN/TISSUE INTEGRITY - ADULT  Goal: Skin integrity remains intact  Description  INTERVENTIONS  - A

## 2020-02-16 NOTE — PROGRESS NOTES
[facility]    Progress Note    Angelika Garza Patient Status:  Inpatient    1945 MRN [id number]   Location [facility] 4W/SW/SE Attending Joi Steen, [address] [facility] Day # 6 PCP Monse Rodriguez MD            Subjective:     Melvina Trent 0. 70 0.87   GFRAA  --  80   < > 105 108 99   GFRNAA  --  69   < > 91 94 86   CA  --  8.4*   < > 7.6* 7.8* 8.1*   ALB 2.5* 2.5*   < > 2.0* 1.9* 2.1*   * 127*   < > 134* 135* 136   K 4.90 4.5   < > 4.5 4.2 4.2   CL 92* 93*   < > 103 103 101   CO2 28.6

## 2020-02-16 NOTE — PROGRESS NOTES
[facility] Hospitalist Progress Note   CC: follow-up hospital admission    SUBJECTIVE:  Interval History:   - no bm yesterday, is passing flatus, tolerated cream of wheat   - ambulating  - still coughing w/ minimal sputum, no fevers, chills, dyspnea    OBJECTIVE: lesions. No erythema. Psychiatric: Appropriate mood and affect.     Data Review:   CBC:   Recent Labs   Lab 02/11/20  0610 02/12/20  0546 02/13/20  0512 02/14/20  0629 02/15/20  0559   WBC 12.9* 16.1* 12.1* 12.9* 10.5   RBC 3.15* 3.74* 3.30* 3.54* 3.31* CT- chronic R hydronephrosis UPJ obs, ?  Orchitis,   -  consulted john eval, NO clinical s/sx orchitis, monitor off abx, hydro is chronic, rec med management at this time     Hyponatremia  - renal on cs w/u ordered  - likely holding hctz indefinitely   - s

## 2020-02-17 ENCOUNTER — APPOINTMENT (OUTPATIENT)
Dept: GENERAL RADIOLOGY | Facility: HOSPITAL | Age: 75
DRG: 388 | End: 2020-02-17
Attending: SURGERY
Payer: MEDICARE

## 2020-02-17 LAB
ALBUMIN SERPL-MCNC: 2 G/DL (ref 3.4–5)
ANION GAP SERPL CALC-SCNC: 2 MMOL/L (ref 0–18)
BUN BLD-MCNC: 20 MG/DL (ref 7–18)
BUN/CREAT SERPL: 23.5 (ref 10–20)
CALCIUM BLD-MCNC: 8.4 MG/DL (ref 8.5–10.1)
CHLORIDE SERPL-SCNC: 103 MMOL/L (ref 98–112)
CO2 SERPL-SCNC: 31 MMOL/L (ref 21–32)
CREAT BLD-MCNC: 0.85 MG/DL (ref 0.7–1.3)
GLUCOSE BLD-MCNC: 95 MG/DL (ref 70–99)
OSMOLALITY SERPL CALC.SUM OF ELEC: 284 MOSM/KG (ref 275–295)
PHOSPHATE SERPL-MCNC: 3.1 MG/DL (ref 2.5–4.9)
POTASSIUM SERPL-SCNC: 4.3 MMOL/L (ref 3.5–5.1)
SODIUM SERPL-SCNC: 136 MMOL/L (ref 136–145)

## 2020-02-17 PROCEDURE — 97535 SELF CARE MNGMENT TRAINING: CPT

## 2020-02-17 PROCEDURE — 97162 PT EVAL MOD COMPLEX 30 MIN: CPT

## 2020-02-17 PROCEDURE — 97530 THERAPEUTIC ACTIVITIES: CPT

## 2020-02-17 PROCEDURE — 80069 RENAL FUNCTION PANEL: CPT | Performed by: INTERNAL MEDICINE

## 2020-02-17 PROCEDURE — 97116 GAIT TRAINING THERAPY: CPT

## 2020-02-17 PROCEDURE — 97165 OT EVAL LOW COMPLEX 30 MIN: CPT

## 2020-02-17 PROCEDURE — 74019 RADEX ABDOMEN 2 VIEWS: CPT | Performed by: SURGERY

## 2020-02-17 PROCEDURE — 94640 AIRWAY INHALATION TREATMENT: CPT

## 2020-02-17 RX ORDER — POLYETHYLENE GLYCOL 3350 17 G/17G
17 POWDER, FOR SOLUTION ORAL 2 TIMES DAILY
Status: DISCONTINUED | OUTPATIENT
Start: 2020-02-17 | End: 2020-02-18

## 2020-02-17 NOTE — PROGRESS NOTES
[place] [facility] - [facility]    Progress Note    Jamila Fuller Patient Status:  Inpatient    1945 MRN [id number]   Location [facility] 4W/SW/SE Attending Marcial Onofreer, [address] [facility] Day # 7 PCP Yoanna Kiser MD            Subjective:     Zachary Barr 0. 70 0.87 0.85   GFRAA  --  80   < > 108 99 100   GFRNAA  --  69   < > 94 86 86   CA  --  8.4*   < > 7.8* 8.1* 8.4*   ALB 2.5* 2.5*   < > 1.9* 2.1* 2.0*   * 127*   < > 135* 136 136   K 4.90 4.5   < > 4.2 4.2 4.3   CL 92* 93*   < > 103 101 103   CO2 2

## 2020-02-17 NOTE — OCCUPATIONAL THERAPY NOTE
OCCUPATIONAL THERAPY EVALUATION - INPATIENT     Room Number: 462/462-A  Evaluation Date: 2/17/2020  Type of Evaluation: Initial       Physician Order: IP Consult to Occupational Therapy  Reason for Therapy: ADL/IADL Dysfunction and Discharge Planning    OC [facility])  Active Problems:    Partial intestinal obstruction ([address]    Hyponatremia      Past Medical History  Past Medical History:   Diagnosis Date   • Depression     Dr. Margie Lerner (psychiatry) @ [facility]   • Elevated PSA     biopsy negative 10/2012. functional limits     STRENGTH ASSESSMENT  Upper extremity strength is within functional limits          ACTIVITIES OF DAILY LIVING ASSESSMENT  AM-PAC ‘6-Clicks’ Inpatient Daily Activity Short Form  How much help from another person does the patient carlos

## 2020-02-17 NOTE — PLAN OF CARE
Patient resting comfortable. Tolerating diet. VSS. No complaints of pain. Cough medication administered as needed. Urinal at bedside. Droplet precautions maintained. Plan for f/u obstructive series today. Outpatient colonoscopy eventually.  To discharge home patient safety including physical limitations  - Instruct pt to call for assistance with activity based on assessment  - Modify environment to reduce risk of injury  - Provide assistive devices as appropriate  - Consider OT/PT consult to assist with streng ordered antiemetic medications  - Provide nonpharmacologic comfort measures as appropriate  - Advance diet as tolerated, if ordered  - Obtain nutritional consult as needed  - Evaluate fluid balance  Outcome: Progressing  Goal: Maintains adequate nutritiona oxygenation and minimize respiratory effort  - Oxygen supplementation based on oxygen saturation or ABGs  - Provide Smoking Cessation handout, if applicable  - Encourage broncho-pulmonary hygiene including cough, deep breathe, Incentive Spirometry  - Asses patient on fluid and nutrition restrictions as appropriate  Outcome: Progressing     Problem: SKIN/TISSUE INTEGRITY - ADULT  Goal: Skin integrity remains intact  Description  INTERVENTIONS  - Assess and document risk factors for pressure ulcer development

## 2020-02-17 NOTE — PROGRESS NOTES
GI  PROGRESS NOTE    SUBJECTIVE: tolerating diet. No abd pain; (+) flatus.        OBJECTIVE:  Temp:  [98.1 °F (36.7 °C)-98.7 °F (37.1 °C)] 98.4 °F (36.9 °C)  Pulse:  [65-87] 68  Resp:  [19-20] 20  BP: (124-157)/(70-99) 146/76  Exam  Gen: No acute distress 5 mg Oral Daily   • Levothyroxine Sodium  125 mcg Oral Before breakfast   • Rosuvastatin Calcium  5 mg Oral Nightly   • Metoprolol Succinate ER  25 mg Oral Daily Beta Blocker   • tamsulosin HCl  0.4 mg Oral Daily       Saline Nasal Spray, ipratropium-albut

## 2020-02-17 NOTE — PHYSICAL THERAPY NOTE
PHYSICAL THERAPY EVALUATION - INPATIENT     Room Number: 462/462-A  Evaluation Date: 2/17/2020  Type of Evaluation: Initial   Physician Order: PT Eval and Treat    Presenting Problem: Partial intestinal obstruction  Reason for Therapy: Mobility Dysfunctio Bed mobility; Body mechanics; Endurance; Energy conservation;Patient education; Family education;Gait training;Range of motion;Strengthening;Stoop training;Stair training;Transfer training;Balance training  Rehab Potential : Good  Frequency (Obs): 3-5x/week I put a mask on, can I walk in the [place]? \"    PHYSICAL THERAPY EXAMINATION     OBJECTIVE  Precautions: Bed/chair alarm(BL hand tremors)  Fall Risk: Standard fall risk    WEIGHT BEARING RESTRICTION  Weight Bearing Restriction: None                PAIN ASSES walker  Pattern: (cues for safe management of RW with turns)  Stoop/Curb Assistance: Not tested     Exercise/Education Provided:  Bed mobility  Body mechanics  Energy conservation  Functional activity tolerated  Gait training  Posture  ROM  Strengthening

## 2020-02-17 NOTE — PROGRESS NOTES
[facility]    Progress Note    Anibal Rueda Patient Status:  Inpatient    1945 MRN [id number]   Location [facility] 4W/SW/SE Attending Chely Sam, [address] [facility] Day # 6 PCP Nanda Neri MD     Assessment and Plan:   P  (H) 02/16/2020    CA 8.1 (L) 02/16/2020    ALB 2.1 (L) 02/16/2020    ALKPHO 99 02/10/2020    BILT 0.7 02/10/2020    TP 6.7 02/10/2020    AST 32 02/10/2020    ALT 32 02/10/2020    INR 1.0 10/19/2015    T4F 1.35 06/01/2017    TSH 4.841 03/18/2019

## 2020-02-17 NOTE — PROGRESS NOTES
[place] [facility]    Nephrology Progress Note    Assessment and Plan:   Patient is a 69year old male with PMH of HTN, HL, EMELY, CKD stage 3, Hypothyroidism, presented to the [facility] With abdominal pain found to have bowel obstruction, severe right juanjo 31.0 02/17/2020    GLU 95 02/17/2020    CA 8.4 (L) 02/17/2020    ALB 2.0 (L) 02/17/2020    ALKPHO 99 02/10/2020    BILT 0.7 02/10/2020    TP 6.7 02/10/2020    AST 32 02/10/2020    ALT 32 02/10/2020    INR 1.0 10/19/2015    T4F 1.35 06/01/2017    TSH 4.841

## 2020-02-17 NOTE — PLAN OF CARE
Pt alert and oriented 4. VSS. On room air. Tolerating diet, no nausea. No BM yet, miralax give. Voiding in urinal. No complaints of pain. IV levaquin administered. Obstructive series done today, increase in colon distention.  No surgical intervention at [facility] assessment.  - Educate pt/family on patient safety including physical limitations  - Instruct pt to call for assistance with activity based on assessment  - Modify environment to reduce risk of injury  - Provide assistive devices as appropriate  - Consider ordered  - Evaluate effectiveness of ordered antiemetic medications  - Provide nonpharmacologic comfort measures as appropriate  - Advance diet as tolerated, if ordered  - Obtain nutritional consult as needed  - Evaluate fluid balance  Outcome: Progressing and behavior  - Position to facilitate oxygenation and minimize respiratory effort  - Oxygen supplementation based on oxygen saturation or ABGs  - Provide Smoking Cessation handout, if applicable  - Encourage broncho-pulmonary hygiene including cough, deep oral intake as appropriate  - Instruct patient on fluid and nutrition restrictions as appropriate  Outcome: Progressing     Problem: SKIN/TISSUE INTEGRITY - ADULT  Goal: Skin integrity remains intact  Description  INTERVENTIONS  - Assess and document risk

## 2020-02-18 VITALS
SYSTOLIC BLOOD PRESSURE: 147 MMHG | TEMPERATURE: 99 F | RESPIRATION RATE: 18 BRPM | HEART RATE: 71 BPM | WEIGHT: 153.69 LBS | BODY MASS INDEX: 28.28 KG/M2 | DIASTOLIC BLOOD PRESSURE: 73 MMHG | HEIGHT: 62 IN | OXYGEN SATURATION: 94 %

## 2020-02-18 LAB
ALBUMIN SERPL-MCNC: 2.1 G/DL (ref 3.4–5)
ANION GAP SERPL CALC-SCNC: 4 MMOL/L (ref 0–18)
BASOPHILS # BLD: 0 X10(3) UL (ref 0–0.2)
BASOPHILS NFR BLD: 0 %
BUN BLD-MCNC: 21 MG/DL (ref 7–18)
BUN/CREAT SERPL: 25.9 (ref 10–20)
CALCIUM BLD-MCNC: 8.5 MG/DL (ref 8.5–10.1)
CHLORIDE SERPL-SCNC: 102 MMOL/L (ref 98–112)
CO2 SERPL-SCNC: 29 MMOL/L (ref 21–32)
CREAT BLD-MCNC: 0.81 MG/DL (ref 0.7–1.3)
DEPRECATED RDW RBC AUTO: 44.5 FL (ref 35.1–46.3)
EOSINOPHIL # BLD: 0.13 X10(3) UL (ref 0–0.7)
EOSINOPHIL NFR BLD: 1 %
ERYTHROCYTE [DISTWIDTH] IN BLOOD BY AUTOMATED COUNT: 13.8 % (ref 11–15)
GLUCOSE BLD-MCNC: 91 MG/DL (ref 70–99)
HCT VFR BLD AUTO: 31.7 % (ref 39–53)
HGB BLD-MCNC: 10.8 G/DL (ref 13–17.5)
LYMPHOCYTES NFR BLD: 16 %
LYMPHOCYTES NFR BLD: 2.02 X10(3) UL (ref 1–4)
MCH RBC QN AUTO: 30.2 PG (ref 26–34)
MCHC RBC AUTO-ENTMCNC: 34.1 G/DL (ref 31–37)
MCV RBC AUTO: 88.5 FL (ref 80–100)
MONOCYTES # BLD: 0.63 X10(3) UL (ref 0.1–1)
MONOCYTES NFR BLD: 5 %
NEUTROPHILS # BLD AUTO: 8.85 X10 (3) UL (ref 1.5–7.7)
NEUTROPHILS NFR BLD: 77 %
NEUTS BAND NFR BLD: 1 %
NEUTS HYPERSEG # BLD: 9.83 X10(3) UL (ref 1.5–7.7)
OSMOLALITY SERPL CALC.SUM OF ELEC: 283 MOSM/KG (ref 275–295)
PHOSPHATE SERPL-MCNC: 3.3 MG/DL (ref 2.5–4.9)
PLATELET # BLD AUTO: 384 10(3)UL (ref 150–450)
PLATELET MORPHOLOGY: NORMAL
POTASSIUM SERPL-SCNC: 4.2 MMOL/L (ref 3.5–5.1)
RBC # BLD AUTO: 3.58 X10(6)UL (ref 3.8–5.8)
SODIUM SERPL-SCNC: 135 MMOL/L (ref 136–145)
TOTAL CELLS COUNTED: 100
WBC # BLD AUTO: 12.6 X10(3) UL (ref 4–11)

## 2020-02-18 PROCEDURE — 85027 COMPLETE CBC AUTOMATED: CPT | Performed by: PHYSICIAN ASSISTANT

## 2020-02-18 PROCEDURE — 80069 RENAL FUNCTION PANEL: CPT | Performed by: INTERNAL MEDICINE

## 2020-02-18 PROCEDURE — 85025 COMPLETE CBC W/AUTO DIFF WBC: CPT | Performed by: PHYSICIAN ASSISTANT

## 2020-02-18 PROCEDURE — 85007 BL SMEAR W/DIFF WBC COUNT: CPT | Performed by: PHYSICIAN ASSISTANT

## 2020-02-18 PROCEDURE — 94640 AIRWAY INHALATION TREATMENT: CPT

## 2020-02-18 RX ORDER — POLYETHYLENE GLYCOL 3350 17 G/17G
17 POWDER, FOR SOLUTION ORAL DAILY
Refills: 0 | Status: SHIPPED | COMMUNITY
Start: 2020-02-18

## 2020-02-18 RX ORDER — LEVOFLOXACIN 750 MG/1
750 TABLET ORAL ONCE
Status: COMPLETED | OUTPATIENT
Start: 2020-02-18 | End: 2020-02-18

## 2020-02-18 RX ORDER — LISINOPRIL 10 MG/1
10 TABLET ORAL DAILY
Status: DISCONTINUED | OUTPATIENT
Start: 2020-02-18 | End: 2020-02-18

## 2020-02-18 NOTE — PLAN OF CARE
Patient A/O x 4. VSS. Remote tele in place - no calls received. Tolerating low fiber/soft diet. Voiding freely. Denies pain and nausea. Fall precautions in place. Call light within reach. Patient updated on plan of care.  Plan for possible d/c home later to for physical needs  - Identify cognitive and physical deficits and behaviors that affect risk of falls.   - [place] fall precautions as indicated by assessment.  - Educate pt/family on patient safety including physical limitations  - Instruct pt to call f consult as needed  - Establish a toileting routine/schedule  - Consider collaborating with pharmacy to review patient's medication profile  2/18/2020 0048 by Gertrudis Prado RN  Outcome: Progressing  2/18/2020 0047 by Gertrudis Prado RN  Outcome: Evaluate fluid balance, assess for edema, trend weights  2/18/2020 0048 by Genemary ellen Boss, RN  Outcome: Progressing  2/18/2020 0047 by Gene [facility], RN  Outcome: Progressing  Goal: Absence of cardiac arrhythmias or at baseline  Description  INT bladder emptying  2/18/2020 0048 by Monica Mora RN  Outcome: Progressing  2/18/2020 0047 by Monica Mora RN  Outcome: Progressing     Problem: METABOLIC/FLUID AND ELECTROLYTES - ADULT  Goal: Electrolytes maintained within normal limits  Taty Martinez membranes remain intact  Description  INTERVENTIONS  - Assess oral mucosa and hygiene practices  - Implement preventative oral hygiene regimen  - Implement oral medicated treatments as ordered  2/18/2020 0048 by Carolina Tamayo RN  Outcome: Progressin

## 2020-02-18 NOTE — DISCHARGE SUMMARY
[place] [facility]    Discharge Summary    Cleave Ill Patient Status:  Inpatient    1945 MRN [id number]   Location [facility] 4W/SW/SE Attending Jose Loera, [address] [facility] Day # 8 PCP Aníbal Arriaga MD     Date of Admission: 2 GI consulted, he was treated w/ slow diet advancement, bowel regimen and improved. He had a mild khoi and hyponatremia, renal managed his fluids, both resolved.       Problem List   Abdominal pain  Bowel obstruction v ileus  Leukocytosis  Hyponatremia  RSV height 157.5 cm (5' 2\"), weight 153 lb 11.2 oz (69.7 kg), SpO2 94 %. General: No acute distress. Alert and oriented x 3. HEENT: Moist mucous membranes. EOM-I. PERRL  Neck: No lymphadenopathy. No JVD. No carotid bruits.   Respiratory: Clear to auscult These medications were sent to [address] [phone], [address], [facility] [address]    Phone:  [phone]   · Rosuvastatin Calcium 5 MG Tabs                  Fo

## 2020-02-18 NOTE — PROGRESS NOTES
[place] [facility] - [facility]    Progress Note    Fuentes Springer Patient Status:  Inpatient    1945 MRN [id number]   Location [facility] 4W/SW/SE Attending Monica Linda, [address] [facility] Day # 8 PCP Adela Gomez MD            Subjective:     Roz Curry 135*   K 4.2 4.3 4.2    103 102   CO2 29.0 31.0 29.0             Xr Abdomen Obstructive Series Routine(2 Vw)(cpt=74019)    Result Date: 2/17/2020  CONCLUSION:  1. Colonic ileus with slight increase in colon distention as compared to prior study.     D

## 2020-02-18 NOTE — PLAN OF CARE
Pt alert and oriented x4. VSS. On room air. Tolerating diet, had BM today, no nausea. Voiding WNL in urinal. No complaints of pain. PO levaquin given. Cleared for discharge. Instructions given. Pt agreeable.    Problem: Patient Centered Care  Goal: Patient call for assistance with activity based on assessment  - Modify environment to reduce risk of injury  - Provide assistive devices as appropriate  - Consider OT/PT consult to assist with strengthening/mobility  - Encourage toileting schedule  Outcome: Adequ Provide nonpharmacologic comfort measures as appropriate  - Advance diet as tolerated, if ordered  - Obtain nutritional consult as needed  - Evaluate fluid balance  Outcome: Adequate for Discharge  Goal: Maintains adequate nutritional intake (undernourishe facilitate oxygenation and minimize respiratory effort  - Oxygen supplementation based on oxygen saturation or ABGs  - Provide Smoking Cessation handout, if applicable  - Encourage broncho-pulmonary hygiene including cough, deep breathe, Incentive Spiromet Encourage oral intake as appropriate  - Instruct patient on fluid and nutrition restrictions as appropriate  Outcome: Adequate for Discharge     Problem: SKIN/TISSUE INTEGRITY - ADULT  Goal: Skin integrity remains intact  Description  INTERVENTIONS  - Asse

## 2020-02-19 NOTE — PAYOR COMM NOTE
--------------  DISCHARGE REVIEW    Payor: [facility] Gustavo Noriega [place] #:  [id number]  Authorization Number: [id number]    Admit date: 2/10/20  Admit time:  2349  Discharge Date: 2/18/2020  6:42 PM     Admitting Physician: Melania Petersen MD hyponatremia as well as abnormal CT and testicular US showing findings of mod to severe R hydronephrosis and UPJ obstruction as well as possible orchitis. He denies any urinary sx. He has had an NG placed to decompression and had some noted relief since. days for bronchospasm      HTN, HLD  - resume home toprol  - hctz on hold indefinitely  - resume home benazepril-amlodipine   - statin      CKD3  - stable     MDD  - cont home meds     Hypothyroidism  - synthroid      Consultants     Provider Role Specialt LOTREL  TAKE 1 CAPSULE BY MOUTH  EVERY DAY FOR BLOOD  PRESSURE     ARIpiprazole 5 MG Tabs  Commonly known as:  ABILIFY     DULoxetine HCl 60 MG Cpep  Commonly known as:  CYMBALTA  Take two capsules by mouth every day for depression     finasteride 5 MG Tab

## 2020-07-04 ENCOUNTER — LAB ENCOUNTER (OUTPATIENT)
Dept: LAB | Facility: HOSPITAL | Age: 75
End: 2020-07-04
Attending: INTERNAL MEDICINE
Payer: MEDICARE

## 2020-07-04 DIAGNOSIS — R19.7 DIARRHEA: ICD-10-CM

## 2020-07-04 DIAGNOSIS — K27.4 PEPTIC ULCER WITH HEMORRHAGE: ICD-10-CM

## 2020-07-04 DIAGNOSIS — R63.4 LOSS OF WEIGHT: ICD-10-CM

## 2020-07-04 DIAGNOSIS — Z01.818 PRE-OP TESTING: ICD-10-CM

## 2020-07-05 LAB — SARS-COV-2 RNA RESP QL NAA+PROBE: NOT DETECTED

## 2020-07-06 ENCOUNTER — ANESTHESIA EVENT (OUTPATIENT)
Dept: ENDOSCOPY | Facility: HOSPITAL | Age: 75
End: 2020-07-06
Payer: MEDICARE

## 2020-07-06 ENCOUNTER — ANESTHESIA (OUTPATIENT)
Dept: ENDOSCOPY | Facility: HOSPITAL | Age: 75
End: 2020-07-06
Payer: MEDICARE

## 2020-07-06 ENCOUNTER — HOSPITAL ENCOUNTER (OUTPATIENT)
Facility: HOSPITAL | Age: 75
Setting detail: HOSPITAL OUTPATIENT SURGERY
Discharge: HOME | End: 2020-07-06
Attending: INTERNAL MEDICINE | Admitting: INTERNAL MEDICINE
Payer: MEDICARE

## 2020-07-06 DIAGNOSIS — Z01.818 PRE-OP TESTING: ICD-10-CM

## 2020-07-06 DIAGNOSIS — R19.7 DIARRHEA: ICD-10-CM

## 2020-07-06 DIAGNOSIS — K56.609: ICD-10-CM

## 2020-07-06 DIAGNOSIS — K59.89: ICD-10-CM

## 2020-07-06 DIAGNOSIS — R63.4 LOSS OF WEIGHT: Primary | ICD-10-CM

## 2020-07-06 DIAGNOSIS — R19.7 DIARRHEA OF PRESUMED INFECTIOUS ORIGIN: ICD-10-CM

## 2020-07-06 DIAGNOSIS — K27.6: ICD-10-CM

## 2020-07-06 DIAGNOSIS — K27.4 PEPTIC ULCER WITH HEMORRHAGE: ICD-10-CM

## 2020-07-06 PROCEDURE — 0DB68ZX EXCISION OF STOMACH, VIA NATURAL OR ARTIFICIAL OPENING ENDOSCOPIC, DIAGNOSTIC: ICD-10-PCS | Performed by: INTERNAL MEDICINE

## 2020-07-06 PROCEDURE — 88312 SPECIAL STAINS GROUP 1: CPT | Performed by: INTERNAL MEDICINE

## 2020-07-06 PROCEDURE — 88305 TISSUE EXAM BY PATHOLOGIST: CPT | Performed by: INTERNAL MEDICINE

## 2020-07-06 PROCEDURE — 0DB98ZX EXCISION OF DUODENUM, VIA NATURAL OR ARTIFICIAL OPENING ENDOSCOPIC, DIAGNOSTIC: ICD-10-PCS | Performed by: INTERNAL MEDICINE

## 2020-07-06 PROCEDURE — 0DBN8ZX EXCISION OF SIGMOID COLON, VIA NATURAL OR ARTIFICIAL OPENING ENDOSCOPIC, DIAGNOSTIC: ICD-10-PCS | Performed by: INTERNAL MEDICINE

## 2020-07-06 RX ORDER — LIDOCAINE HYDROCHLORIDE 10 MG/ML
INJECTION, SOLUTION EPIDURAL; INFILTRATION; INTRACAUDAL; PERINEURAL AS NEEDED
Status: DISCONTINUED | OUTPATIENT
Start: 2020-07-06 | End: 2020-07-06 | Stop reason: SURG

## 2020-07-06 RX ORDER — SODIUM CHLORIDE, SODIUM LACTATE, POTASSIUM CHLORIDE, CALCIUM CHLORIDE 600; 310; 30; 20 MG/100ML; MG/100ML; MG/100ML; MG/100ML
INJECTION, SOLUTION INTRAVENOUS CONTINUOUS
Status: DISCONTINUED | OUTPATIENT
Start: 2020-07-06 | End: 2020-07-06

## 2020-07-06 RX ADMIN — SODIUM CHLORIDE, SODIUM LACTATE, POTASSIUM CHLORIDE, CALCIUM CHLORIDE: 600; 310; 30; 20 INJECTION, SOLUTION INTRAVENOUS at 14:10:00

## 2020-07-06 RX ADMIN — SODIUM CHLORIDE, SODIUM LACTATE, POTASSIUM CHLORIDE, CALCIUM CHLORIDE: 600; 310; 30; 20 INJECTION, SOLUTION INTRAVENOUS at 14:45:00

## 2020-07-06 RX ADMIN — LIDOCAINE HYDROCHLORIDE 25 MG: 10 INJECTION, SOLUTION EPIDURAL; INFILTRATION; INTRACAUDAL; PERINEURAL at 14:15:00

## 2020-07-06 NOTE — OPERATIVE REPORT
EGD/Colonoscopy Operative Report    Mayi Bishopibaldi Patient Status:  [facility]    1945 MRN [id number]   Location [facility] ENDOSCOPY LAB SUITES Attending Sima Palacios MD no immediate complications. The patient was then repositioned for colonoscopy. After careful digital rectal examination, the Adult colonoscope was inserted into the rectum and advanced to the level of the cecum under direct visualization.  The cecum was

## 2020-07-06 NOTE — DISCHARGE SUMMARY
ENDOSCOPY DISCHARGE INSTRUCTIONS    Procedure Performed:   Gastroscopy and Colonoscopy    Endoscopist: No name on file. FINDINGS:   There was mild gastritis which is irritation of the stomach lining and a small colon polyp which was removed.   The exam was

## 2020-07-06 NOTE — ANESTHESIA PREPROCEDURE EVALUATION
Anesthesia PreOp Note    HPI:     Anibal Rueda is a 69year old male who presents for preoperative consultation requested by: Tad Bose MD    Date of Surgery: 7/6/2020    Procedure(s):  ESOPHAGOGASTRODUODENOSCOPY (EGD)  COLONOSCOPY  Indication: Lo (obstructive sleep apnea) 3/24/18-PSG    AHI 7 RDI 9 REM AHI 5 Supine AHI 9 non-supine AHI 3.8 Sao2 Gideon 82%    • EMELY (obstructive sleep apnea) 3/11/2019   • Other and unspecified hyperlipidemia    • Pneumonia due to organism    • Post-surgical hypothyroi current Epic-ordered outpatient medications on file.         Soha                  OTHER (SEE COMMENTS)    Comment:Cats-congestion in chest when around cats    Family History   Problem Relation Age of Onset   • Heart Disorder Father         CAD   • Other labs reviewed.   Lab Results   Component Value Date    WBC 8.04 05/05/2020    RBC 4.28 05/05/2020    HGB 13.3 05/05/2020    HCT 38.9 05/05/2020    MCV 90.9 05/05/2020    MCH 31.1 05/05/2020    MCHC 34.2 05/05/2020    RDW 13.9 05/05/2020     05/05/202 anesthetic management as planned.   Cheryle Church Horwath  7/6/2020 2:00 PM

## 2020-07-06 NOTE — ANESTHESIA POSTPROCEDURE EVALUATION
Patient: Puneet Dutta    Procedure Summary     Date:  07/06/20 Room / Location:  [address] [place] [address] ENDOSCOPY 04 / [address] [place] [address] ENDOSCOPY    Anesthesia Start:  3838 Anesthesia Stop:  0249    Procedures:       ESOPHAGOGASTRODUODENOSCOPY (EGD) (N/A )      COLONOSCOPY (N/A ) Ayse

## 2020-07-06 NOTE — H&P
HISTORY AND PHYSICAL FOR ENDOSCOPIC PROCEDURE      Jamila Fuller Patient Status:  [facility] Outpatient Surgery    1945 MRN [id number]   Location [facility] ENDOSCOPY LAB SUITES Attending Faith Carias MD   Hosp Day # 0 PCP Yoanna Kiser, Onset   • Heart Disorder Father         CAD   • Other (Other) Mother         dementia      reports that he has never smoked. He has never used smokeless tobacco. He reports previous alcohol use. He reports that he does not use drugs.     Allergies:    Mp Risks include but not limited to bleeding, infection, perforation, missed polyps or cancer and risks of sedation.        Eva Newsome  7/6/2020  1:51 PM

## 2020-07-07 VITALS
OXYGEN SATURATION: 98 % | HEIGHT: 62 IN | WEIGHT: 158 LBS | RESPIRATION RATE: 16 BRPM | BODY MASS INDEX: 29.08 KG/M2 | HEART RATE: 50 BPM | SYSTOLIC BLOOD PRESSURE: 127 MMHG | DIASTOLIC BLOOD PRESSURE: 64 MMHG

## 2020-07-08 NOTE — PROGRESS NOTES
Pathology unremarkable for weight loss which has now resolved. Colon polyp was just hypertrophied mucosa. Discussed results with patient.

## 2021-01-11 PROBLEM — K56.600 PARTIAL INTESTINAL OBSTRUCTION, UNSPECIFIED CAUSE (HCC): Status: RESOLVED | Noted: 2020-02-10 | Resolved: 2021-01-11

## 2021-01-11 PROBLEM — K56.600 PARTIAL INTESTINAL OBSTRUCTION (HCC): Status: RESOLVED | Noted: 2020-02-10 | Resolved: 2021-01-11

## 2021-05-20 NOTE — PROGRESS NOTES
[place] [facility] - [facility]    Progress Note    Chiqui Dangelo Patient Status:  Inpatient    1945 MRN [id number]   Location [facility] 4W/SW/SE Attending Lesley Hess, [address] [facility] [place] Day # 5 PCP Azael Yee MD     Assessment and Plan:   P 02/15/2020     (H) 02/15/2020    CA 7.8 (L) 02/15/2020    ALB 1.9 (L) 02/15/2020    ALKPHO 99 02/10/2020    BILT 0.7 02/10/2020    TP 6.7 02/10/2020    AST 32 02/10/2020    ALT 32 02/10/2020    INR 1.0 10/19/2015    T4F 1.35 06/01/2017    TSH 4.841 FS= 118 at 1324/POCT Blood Glucose/COVID-19 FS= 118 at 1324 COVID NEGATIVE 5//17/2021/POCT Blood Glucose/COVID-19

## 2022-02-28 PROBLEM — I50.30 DIASTOLIC HEART FAILURE, STAGE B (HCC): Status: ACTIVE | Noted: 2022-02-28

## 2022-02-28 PROBLEM — N18.31 STAGE 3A CHRONIC KIDNEY DISEASE (HCC): Status: ACTIVE | Noted: 2022-02-28

## 2022-02-28 PROBLEM — E87.1 HYPONATREMIA: Status: RESOLVED | Noted: 2020-02-10 | Resolved: 2022-02-28

## 2024-02-27 ENCOUNTER — HOSPITAL ENCOUNTER (EMERGENCY)
Facility: HOSPITAL | Age: 79
Discharge: HOME | End: 2024-02-27
Payer: MEDICARE

## 2024-02-27 ENCOUNTER — APPOINTMENT (OUTPATIENT)
Dept: CT IMAGING | Facility: HOSPITAL | Age: 79
End: 2024-02-27
Attending: NURSE PRACTITIONER
Payer: MEDICARE

## 2024-02-27 VITALS
OXYGEN SATURATION: 93 % | HEIGHT: 60 IN | WEIGHT: 161 LBS | DIASTOLIC BLOOD PRESSURE: 66 MMHG | HEART RATE: 59 BPM | BODY MASS INDEX: 31.61 KG/M2 | RESPIRATION RATE: 20 BRPM | TEMPERATURE: 98 F | SYSTOLIC BLOOD PRESSURE: 123 MMHG

## 2024-02-27 DIAGNOSIS — K42.9 UMBILICAL HERNIA WITHOUT OBSTRUCTION AND WITHOUT GANGRENE: Primary | ICD-10-CM

## 2024-02-27 LAB
ALBUMIN SERPL-MCNC: 4.2 G/DL (ref 3.2–4.8)
ALBUMIN/GLOB SERPL: 1.5 {RATIO} (ref 1–2)
ALP LIVER SERPL-CCNC: 65 U/L
ALT SERPL-CCNC: 18 U/L
ANION GAP SERPL CALC-SCNC: 2 MMOL/L (ref 0–18)
AST SERPL-CCNC: 25 U/L (ref ?–34)
BASOPHILS # BLD AUTO: 0.04 X10(3) UL (ref 0–0.2)
BASOPHILS NFR BLD AUTO: 0.5 %
BILIRUB SERPL-MCNC: 0.6 MG/DL (ref 0.2–1.1)
BUN BLD-MCNC: 18 MG/DL (ref 9–23)
BUN/CREAT SERPL: 17.1 (ref 10–20)
CALCIUM BLD-MCNC: 10.4 MG/DL (ref 8.7–10.4)
CHLORIDE SERPL-SCNC: 105 MMOL/L (ref 98–112)
CO2 SERPL-SCNC: 32 MMOL/L (ref 21–32)
CREAT BLD-MCNC: 1.05 MG/DL
DEPRECATED RDW RBC AUTO: 43.9 FL (ref 35.1–46.3)
EGFRCR SERPLBLD CKD-EPI 2021: 73 ML/MIN/1.73M2 (ref 60–?)
EOSINOPHIL # BLD AUTO: 0.17 X10(3) UL (ref 0–0.7)
EOSINOPHIL NFR BLD AUTO: 2 %
ERYTHROCYTE [DISTWIDTH] IN BLOOD BY AUTOMATED COUNT: 13.2 % (ref 11–15)
GLOBULIN PLAS-MCNC: 2.8 G/DL (ref 2.8–4.4)
GLUCOSE BLD-MCNC: 112 MG/DL (ref 70–99)
HCT VFR BLD AUTO: 41.2 %
HGB BLD-MCNC: 14.1 G/DL
IMM GRANULOCYTES # BLD AUTO: 0.02 X10(3) UL (ref 0–1)
IMM GRANULOCYTES NFR BLD: 0.2 %
LIPASE SERPL-CCNC: 45 U/L (ref 13–75)
LYMPHOCYTES # BLD AUTO: 2.12 X10(3) UL (ref 1–4)
LYMPHOCYTES NFR BLD AUTO: 24.5 %
MCH RBC QN AUTO: 30.9 PG (ref 26–34)
MCHC RBC AUTO-ENTMCNC: 34.2 G/DL (ref 31–37)
MCV RBC AUTO: 90.4 FL
MONOCYTES # BLD AUTO: 0.75 X10(3) UL (ref 0.1–1)
MONOCYTES NFR BLD AUTO: 8.7 %
NEUTROPHILS # BLD AUTO: 5.56 X10 (3) UL (ref 1.5–7.7)
NEUTROPHILS # BLD AUTO: 5.56 X10(3) UL (ref 1.5–7.7)
NEUTROPHILS NFR BLD AUTO: 64.1 %
OSMOLALITY SERPL CALC.SUM OF ELEC: 291 MOSM/KG (ref 275–295)
PLATELET # BLD AUTO: 194 10(3)UL (ref 150–450)
POTASSIUM SERPL-SCNC: 3.9 MMOL/L (ref 3.5–5.1)
PROT SERPL-MCNC: 7 G/DL (ref 5.7–8.2)
RBC # BLD AUTO: 4.56 X10(6)UL
SODIUM SERPL-SCNC: 139 MMOL/L (ref 136–145)
TROPONIN I SERPL HS-MCNC: 9 NG/L
WBC # BLD AUTO: 8.7 X10(3) UL (ref 4–11)

## 2024-02-27 PROCEDURE — 85025 COMPLETE CBC W/AUTO DIFF WBC: CPT | Performed by: NURSE PRACTITIONER

## 2024-02-27 PROCEDURE — 80053 COMPREHEN METABOLIC PANEL: CPT | Performed by: NURSE PRACTITIONER

## 2024-02-27 PROCEDURE — 99284 EMERGENCY DEPT VISIT MOD MDM: CPT

## 2024-02-27 PROCEDURE — 36415 COLL VENOUS BLD VENIPUNCTURE: CPT

## 2024-02-27 PROCEDURE — 84484 ASSAY OF TROPONIN QUANT: CPT | Performed by: NURSE PRACTITIONER

## 2024-02-27 PROCEDURE — 74177 CT ABD & PELVIS W/CONTRAST: CPT | Performed by: NURSE PRACTITIONER

## 2024-02-27 PROCEDURE — 83690 ASSAY OF LIPASE: CPT | Performed by: NURSE PRACTITIONER

## 2024-02-27 RX ORDER — POLYETHYLENE GLYCOL 3350 17 G/17G
17 POWDER, FOR SOLUTION ORAL DAILY PRN
Qty: 12 EACH | Refills: 0 | Status: SHIPPED | OUTPATIENT
Start: 2024-02-27 | End: 2024-03-28

## 2024-02-27 NOTE — ED INITIAL ASSESSMENT (HPI)
Patient reports hx of umbilical hernia, reports sudden pain in abdomen region today. Patient reports pain is slowly improving, advised by pcp to go to ER.

## 2024-02-28 NOTE — ED PROVIDER NOTES
Patient Seen in: [facility] Emergency Department      History     Chief Complaint   Patient presents with    Abdominal Pain     Stated Complaint: Abdominal Pain;Hernia    Subjective:   79yo/m reports with acutely worsening chronic umbilical hernia. Woke up bloated today with pain his umbilical hernia. Reports bloating has improved so has pain. No fever. No vomiting. No flank pain. Reports hernia appears larger. No trauma. No flank pain. No urinary symptoms            Objective:   No pertinent past medical history.            No pertinent past surgical history.              No pertinent social history.            Review of Systems   All other systems reviewed and are negative.      Positive for stated complaint: Abdominal Pain;Hernia  Other systems are as noted in HPI.  Constitutional and vital signs reviewed.      All other systems reviewed and negative except as noted above.    Physical Exam     ED Triage Vitals   BP 02/27/24 1603 (!) 179/82   Pulse 02/27/24 1603 80   Resp 02/27/24 1603 18   Temp 02/27/24 1603 98 °F (36.7 °C)   Temp src --    SpO2 02/27/24 1603 96 %   O2 Device 02/27/24 2200 None (Room air)       Current:/60   Pulse 66   Temp 98 °F (36.7 °C)   Resp 18   Ht 152.4 cm (5')   Wt 73 kg   SpO2 94%   BMI 31.44 kg/m²         Physical Exam  Vitals and nursing note reviewed.   Constitutional:       General: He is not in acute distress.     Appearance: He is well-developed.   HENT:      Head: Normocephalic and atraumatic.      Nose: Nose normal.      Mouth/Throat:      Mouth: Mucous membranes are moist.   Eyes:      Conjunctiva/sclera: Conjunctivae normal.      Pupils: Pupils are equal, round, and reactive to light.   Cardiovascular:      Rate and Rhythm: Normal rate and regular rhythm.      Heart sounds: Normal heart sounds.   Pulmonary:      Effort: Pulmonary effort is normal.      Breath sounds: Normal breath sounds.   Abdominal:      General: Bowel sounds are normal.      Palpations:  Abdomen is soft.      Hernia: A hernia is present. Hernia is present in the umbilical area.   Musculoskeletal:         General: No tenderness or deformity. Normal range of motion.      Cervical back: Normal range of motion and neck supple.   Skin:     General: Skin is warm and dry.      Capillary Refill: Capillary refill takes less than 2 seconds.      Findings: No rash.      Comments: Normal color   Neurological:      General: No focal deficit present.      Mental Status: He is alert and oriented to person, place, and time.      GCS: GCS eye subscore is 4. GCS verbal subscore is 5. GCS motor subscore is 6.      Cranial Nerves: No cranial nerve deficit.      Gait: Gait normal.               ED Course     Labs Reviewed   COMP METABOLIC PANEL (14) - Abnormal; Notable for the following components:       Result Value    Glucose 112 (*)     All other components within normal limits   TROPONIN I HIGH SENSITIVITY - Normal   LIPASE - Normal   CBC WITH DIFFERENTIAL WITH PLATELET    Narrative:     The following orders were created for panel order CBC With Differential With Platelet.  Procedure                               Abnormality         Status                     ---------                               -----------         ------                     CBC W/ DIFFERENTIAL[[id number]]                              Final result                 Please view results for these tests on the individual orders.   CBC W/ DIFFERENTIAL          Impression  CONCLUSION:     Ventral abdominal wall hernia containing an inflamed area of omental fat.  Findings may be secondary to fat necrosis.     No bowel herniation.     Large amount of excessive stool seen throughout the colon suggestive of constipation. No obstruction.     Multiple other incidental findings as described in the body of the report.             Dictated by (CST): Long Fay MD on 2/27/2024 at 10:29 PM      Finalized by (CST): Long Fay MD on 2/27/2024 at 10:32 PM    MDM                   Medical Decision Making  77yo/m w hx and exam as stated, painful, enlarging hernia to umbilicus    Afebrile  Tolerating po  No vomiting  Labs unremarkable  Ct with omental fat. ?necrosis, no bowel    Plan  Close fu with gen surg      Amount and/or Complexity of Data Reviewed  Labs:  Decision-making details documented in ED Course.  Radiology:  Decision-making details documented in ED Course.    Risk  OTC drugs.  Prescription drug management.        Disposition and Plan     Clinical Impression:  1. Umbilical hernia without obstruction and without gangrene         Disposition:  Discharge  2/27/2024 10:46 pm    Follow-up:  Alfonso Merino MD  [address] [id number] [phone]    Call today      We recommend that you schedule follow up care with a primary care provider within the next three months to obtain basic health screening including reassessment of your blood pressure.      Medications Prescribed:  Current Discharge Medication List        START taking these medications    Details   !! polyethylene glycol, PEG 3350, 17 g Oral Powd Pack Take 17 g by mouth daily as needed.  Qty: 12 each, Refills: 0       !! - Potential duplicate medications found. Please discuss with provider.

## 2025-02-21 RX ORDER — ARIPIPRAZOLE 2 MG/1
2 TABLET ORAL DAILY
COMMUNITY

## 2025-02-21 RX ORDER — ROSUVASTATIN CALCIUM 5 MG/1
1 TABLET, COATED ORAL DAILY
COMMUNITY
Start: 2025-02-01

## 2025-02-21 RX ORDER — HYDROCHLOROTHIAZIDE 12.5 MG/1
25 TABLET ORAL DAILY
Status: ON HOLD | COMMUNITY
Start: 2025-02-01 | End: 2025-02-25 | Stop reason: HOSPADM

## 2025-02-21 RX ORDER — AMLODIPINE BESYLATE AND BENAZEPRIL HYDROCHLORIDE 2.5; 1 MG/1; MG/1
1 CAPSULE ORAL DAILY
COMMUNITY
Start: 2025-02-01

## 2025-02-21 RX ORDER — FINASTERIDE 5 MG/1
5 TABLET, FILM COATED ORAL DAILY
COMMUNITY

## 2025-02-21 RX ORDER — METOPROLOL SUCCINATE 25 MG/1
1 TABLET, EXTENDED RELEASE ORAL DAILY
COMMUNITY
Start: 2024-12-20

## 2025-02-21 RX ORDER — LEVOTHYROXINE SODIUM 125 UG/1
1 TABLET ORAL DAILY
COMMUNITY
Start: 2024-10-28

## 2025-02-21 RX ORDER — TAMSULOSIN HYDROCHLORIDE 0.4 MG/1
0.8 CAPSULE ORAL NIGHTLY
COMMUNITY

## 2025-02-21 RX ORDER — DULOXETIN HYDROCHLORIDE 60 MG/1
120 CAPSULE, DELAYED RELEASE ORAL DAILY
COMMUNITY

## 2025-02-25 ENCOUNTER — HOSPITAL ENCOUNTER (OUTPATIENT)
Age: 80
Discharge: HOME | End: 2025-02-25
Attending: INTERNAL MEDICINE | Admitting: INTERNAL MEDICINE

## 2025-02-25 VITALS
OXYGEN SATURATION: 98 % | SYSTOLIC BLOOD PRESSURE: 110 MMHG | DIASTOLIC BLOOD PRESSURE: 62 MMHG | HEIGHT: 60 IN | HEART RATE: 79 BPM | WEIGHT: 178.57 LBS | RESPIRATION RATE: 15 BRPM | TEMPERATURE: 97.2 F | BODY MASS INDEX: 35.06 KG/M2

## 2025-02-25 DIAGNOSIS — R94.39 ABNORMAL CARDIOVASCULAR STRESS TEST: ICD-10-CM

## 2025-02-25 PROCEDURE — C1769 GUIDE WIRE: HCPCS | Performed by: INTERNAL MEDICINE

## 2025-02-25 PROCEDURE — 93460 R&L HRT ART/VENTRICLE ANGIO: CPT | Performed by: INTERNAL MEDICINE

## 2025-02-25 PROCEDURE — 13000001 HB PHASE II RECOVERY EA 30 MINUTES: Performed by: INTERNAL MEDICINE

## 2025-02-25 PROCEDURE — C1887 CATHETER, GUIDING: HCPCS | Performed by: INTERNAL MEDICINE

## 2025-02-25 PROCEDURE — 10002801 HB RX 250 W/O HCPCS: Performed by: INTERNAL MEDICINE

## 2025-02-25 PROCEDURE — 99152 MOD SED SAME PHYS/QHP 5/>YRS: CPT | Performed by: INTERNAL MEDICINE

## 2025-02-25 PROCEDURE — 10002805 HB CONTRAST AGENT: Performed by: INTERNAL MEDICINE

## 2025-02-25 PROCEDURE — 10006023 HB SUPPLY 272: Performed by: INTERNAL MEDICINE

## 2025-02-25 PROCEDURE — 10002807 HB RX 258: Performed by: INTERNAL MEDICINE

## 2025-02-25 PROCEDURE — C1894 INTRO/SHEATH, NON-LASER: HCPCS | Performed by: INTERNAL MEDICINE

## 2025-02-25 PROCEDURE — 10002800 HB RX 250 W HCPCS: Performed by: INTERNAL MEDICINE

## 2025-02-25 RX ORDER — 0.9 % SODIUM CHLORIDE 0.9 %
2 VIAL (ML) INJECTION EVERY 12 HOURS SCHEDULED
Status: DISCONTINUED | OUTPATIENT
Start: 2025-02-25 | End: 2025-02-25 | Stop reason: HOSPADM

## 2025-02-25 RX ORDER — HYDRALAZINE HYDROCHLORIDE 20 MG/ML
10 INJECTION INTRAMUSCULAR; INTRAVENOUS EVERY 4 HOURS PRN
Status: DISCONTINUED | OUTPATIENT
Start: 2025-02-25 | End: 2025-02-25 | Stop reason: HOSPADM

## 2025-02-25 RX ORDER — CLONIDINE HYDROCHLORIDE 0.1 MG/1
0.1 TABLET ORAL EVERY 4 HOURS PRN
Status: DISCONTINUED | OUTPATIENT
Start: 2025-02-25 | End: 2025-02-25 | Stop reason: HOSPADM

## 2025-02-25 RX ORDER — SODIUM CHLORIDE 9 MG/ML
INJECTION, SOLUTION INTRAVENOUS CONTINUOUS
Status: DISCONTINUED | OUTPATIENT
Start: 2025-02-25 | End: 2025-02-25 | Stop reason: HOSPADM

## 2025-02-25 RX ORDER — VERAPAMIL HYDROCHLORIDE 2.5 MG/ML
INJECTION, SOLUTION INTRAVENOUS PRN
Status: DISCONTINUED | OUTPATIENT
Start: 2025-02-25 | End: 2025-02-25 | Stop reason: HOSPADM

## 2025-02-25 RX ORDER — 0.9 % SODIUM CHLORIDE 0.9 %
10 VIAL (ML) INJECTION PRN
Status: DISCONTINUED | OUTPATIENT
Start: 2025-02-25 | End: 2025-02-25 | Stop reason: HOSPADM

## 2025-02-25 RX ORDER — SODIUM CHLORIDE 9 MG/ML
INJECTION, SOLUTION INTRAVENOUS CONTINUOUS
Status: ACTIVE | OUTPATIENT
Start: 2025-02-25 | End: 2025-02-25

## 2025-02-25 RX ORDER — HEPARIN SODIUM 1000 [USP'U]/ML
INJECTION, SOLUTION INTRAVENOUS; SUBCUTANEOUS PRN
Status: DISCONTINUED | OUTPATIENT
Start: 2025-02-25 | End: 2025-02-25 | Stop reason: HOSPADM

## 2025-02-25 RX ORDER — ACETAMINOPHEN 650 MG/1
650 SUPPOSITORY RECTAL EVERY 4 HOURS PRN
Status: DISCONTINUED | OUTPATIENT
Start: 2025-02-25 | End: 2025-02-25 | Stop reason: HOSPADM

## 2025-02-25 RX ORDER — LIDOCAINE HYDROCHLORIDE 20 MG/ML
INJECTION, SOLUTION EPIDURAL; INFILTRATION; INTRACAUDAL; PERINEURAL PRN
Status: DISCONTINUED | OUTPATIENT
Start: 2025-02-25 | End: 2025-02-25 | Stop reason: HOSPADM

## 2025-02-25 RX ORDER — FUROSEMIDE 20 MG/1
20 TABLET ORAL DAILY
Qty: 30 TABLET | Refills: 0 | Status: SHIPPED | OUTPATIENT
Start: 2025-02-25

## 2025-02-25 RX ORDER — NITROGLYCERIN 0.4 MG/1
0.4 TABLET SUBLINGUAL EVERY 5 MIN PRN
Status: DISCONTINUED | OUTPATIENT
Start: 2025-02-25 | End: 2025-02-25 | Stop reason: HOSPADM

## 2025-02-25 RX ORDER — ASPIRIN 325 MG
325 TABLET ORAL ONCE
Status: DISCONTINUED | OUTPATIENT
Start: 2025-02-25 | End: 2025-02-25 | Stop reason: HOSPADM

## 2025-02-25 RX ORDER — MIDAZOLAM HYDROCHLORIDE 1 MG/ML
INJECTION, SOLUTION INTRAMUSCULAR; INTRAVENOUS PRN
Status: DISCONTINUED | OUTPATIENT
Start: 2025-02-25 | End: 2025-02-25 | Stop reason: HOSPADM

## 2025-02-25 RX ORDER — ACETAMINOPHEN 325 MG/1
650 TABLET ORAL EVERY 4 HOURS PRN
Status: DISCONTINUED | OUTPATIENT
Start: 2025-02-25 | End: 2025-02-25 | Stop reason: HOSPADM

## 2025-02-25 RX ADMIN — SODIUM CHLORIDE: 9 INJECTION, SOLUTION INTRAVENOUS at 08:45

## 2025-02-25 ASSESSMENT — PAIN SCALES - GENERAL
PAINLEVEL_OUTOF10: 0

## (undated) DEVICE — STOPCOCK IV 1050 PSI 3 WAY HI PRSS RT ROTATE MALE LL ID.082

## (undated) DEVICE — LINE MNTR ADLT SET O2 INTMD

## (undated) DEVICE — GUIDEWIRE PROWATER 20CM 180CM 3CM STRGT .014IN VASC

## (undated) DEVICE — Device: Brand: CUSTOM PROCEDURE KIT

## (undated) DEVICE — FORCEP RADIAL JAW 4

## (undated) DEVICE — MEDI-VAC NON-CONDUCTIVE SUCTION TUBING 6MM X 1.8M (6FT.) L: Brand: CARDINAL HEALTH

## (undated) DEVICE — GUIDEWIRE STRT 10CM 260CM J CRV TPR .035IN VASC PTFE PERIPH

## (undated) DEVICE — SHIELD RAD RADPAD RAD PRTC STRL FEM ENTRY ANGIO

## (undated) DEVICE — CONMED SCOPE SAVER BITE BLOCK, 20X27 MM: Brand: SCOPE SAVER

## (undated) DEVICE — DRAPE SURG LVL 4 APERTURE BRR PRTC L42 IN X W29 IN ACTI-GARD

## (undated) DEVICE — CATH IMPULSE FL3.5 6F 100CM

## (undated) DEVICE — BAG WASTE L48 IN SPK FLTR RLLR CLAMP FEMALE LL TUBE VENT

## (undated) DEVICE — SYRINGE 3 ML GRAD NONPYROGENIC DEHP FREE PVC FREE LOK MED

## (undated) DEVICE — SHEATH GUIDE L10 CM .038 IN SNAP ON DIL LOCK KINK RST SMTH

## (undated) DEVICE — HEMOSTAT CMPR LONG OD27 CM VASC

## (undated) DEVICE — Device: Brand: DEFENDO AIR/WATER/SUCTION AND BIOPSY VALVE

## (undated) DEVICE — KIT INTRO OD6 FR L10 CM .021 IN L35 MM FLEX STRGT SHTH DIL ANT WALL

## (undated) DEVICE — Device

## (undated) DEVICE — CATHETER OD6 FR L100 CM LG INNER LUM RADOPQ SLCT JR4 CRV COR

## (undated) DEVICE — KIT MICROINTRODUCER 4FR 21GA 40CM 4CM .018IN SMTH NDL MNDRL